# Patient Record
Sex: MALE | Race: WHITE | Employment: FULL TIME | URBAN - METROPOLITAN AREA
[De-identification: names, ages, dates, MRNs, and addresses within clinical notes are randomized per-mention and may not be internally consistent; named-entity substitution may affect disease eponyms.]

---

## 2019-07-24 ENCOUNTER — OFFICE VISIT (OUTPATIENT)
Dept: INTERNAL MEDICINE CLINIC | Age: 24
End: 2019-07-24
Payer: COMMERCIAL

## 2019-07-24 VITALS
BODY MASS INDEX: 23.23 KG/M2 | WEIGHT: 181 LBS | OXYGEN SATURATION: 100 % | HEART RATE: 72 BPM | SYSTOLIC BLOOD PRESSURE: 126 MMHG | HEIGHT: 74 IN | DIASTOLIC BLOOD PRESSURE: 60 MMHG

## 2019-07-24 DIAGNOSIS — Z23 NEED FOR TETANUS, DIPHTHERIA, AND ACELLULAR PERTUSSIS (TDAP) VACCINE IN PATIENT OF ADOLESCENT AGE OR OLDER: ICD-10-CM

## 2019-07-24 DIAGNOSIS — N50.89 SCROTAL EDEMA: ICD-10-CM

## 2019-07-24 DIAGNOSIS — Z00.00 ANNUAL PHYSICAL EXAM: Primary | ICD-10-CM

## 2019-07-24 DIAGNOSIS — Z11.4 SCREENING FOR HIV (HUMAN IMMUNODEFICIENCY VIRUS): ICD-10-CM

## 2019-07-24 PROCEDURE — 90471 IMMUNIZATION ADMIN: CPT | Performed by: INTERNAL MEDICINE

## 2019-07-24 PROCEDURE — 90715 TDAP VACCINE 7 YRS/> IM: CPT | Performed by: INTERNAL MEDICINE

## 2019-07-24 PROCEDURE — 99385 PREV VISIT NEW AGE 18-39: CPT | Performed by: INTERNAL MEDICINE

## 2019-07-24 ASSESSMENT — PATIENT HEALTH QUESTIONNAIRE - PHQ9
SUM OF ALL RESPONSES TO PHQ QUESTIONS 1-9: 1
SUM OF ALL RESPONSES TO PHQ QUESTIONS 1-9: 1
SUM OF ALL RESPONSES TO PHQ9 QUESTIONS 1 & 2: 1
1. LITTLE INTEREST OR PLEASURE IN DOING THINGS: 0
2. FEELING DOWN, DEPRESSED OR HOPELESS: 1

## 2019-07-24 NOTE — PROGRESS NOTES
on file    Transportation needs:     Medical: Not on file     Non-medical: Not on file   Tobacco Use    Smoking status: Former Smoker     Types: Cigarettes     Last attempt to quit: 2019     Years since quittin.4    Smokeless tobacco: Current User     Types: Chew   Substance and Sexual Activity    Alcohol use: Yes     Comment: occasional     Drug use: Never    Sexual activity: Not Currently   Lifestyle    Physical activity:     Days per week: Not on file     Minutes per session: Not on file    Stress: Not on file   Relationships    Social connections:     Talks on phone: Not on file     Gets together: Not on file     Attends Gnosticist service: Not on file     Active member of club or organization: Not on file     Attends meetings of clubs or organizations: Not on file     Relationship status: Not on file    Intimate partner violence:     Fear of current or ex partner: Not on file     Emotionally abused: Not on file     Physically abused: Not on file     Forced sexual activity: Not on file   Other Topics Concern    Not on file   Social History Narrative    Not on file       Review of Systems:  A comprehensive review of systems was negative except for what was noted in the HPI. Physical Exam:   Vitals:    19 1310   BP: 126/60   Pulse: 72   SpO2: 100%   Weight: 181 lb (82.1 kg)   Height: 6' 2\" (1.88 m)     Body mass index is 23.24 kg/m². Constitutional: He is oriented to person, place, and time. He appears well-developed and well-nourished. No distress. HEENT:   Head: Normocephalic and atraumatic. Right Ear: Tympanic membrane, external ear and ear canal normal.   Left Ear: Tympanic membrane, external ear and ear canal normal.   Mouth/Throat: Oropharynx is clear and moist and mucous membranes are normal. No oropharyngeal exudate or posterior oropharyngeal erythema. He has no cervical adenopathy.    Eyes: Conjunctivae and extraocular motions are normal. Pupils are equal, round, and file for this patient. Influenza vaccine:  recommended every fall  Tetanus vaccine:  tetanus and diptheria vaccine (Td) administered today- risks and benefits discussed         Other Recommendations ·   See a dentist every 6 months  · Try to get at least 30 minutes of exercise 3-5 days per week  · Always wear a seat belt when traveling in a car  · Always wear a helmet when riding a bicycle or motorcycle  · When exposed to the sun, use a sunscreen that protects against both UVA and UVB radiation with an SPF of 30 or greater- reapply every 2 to 3 hours or after sweating, drying off with a towel, or swimming             Assessment/Plan:  Simi Huynh was seen today for establish care. Diagnoses and all orders for this visit:    Annual physical exam  -     Lipid Panel; Future  -     Comprehensive Metabolic Panel; Future  -     CBC with Differential; Future    Need for tetanus, diphtheria, and acellular pertussis (Tdap) vaccine in patient of adolescent age or older  -     Tdap (age 6y and older) IM (Preet Velez)    Screening for HIV (human immunodeficiency virus)  -     HIV Screen; Future    Scrotal edema  -     [facility] - Pavel Mart [facility], [facility]        Return Fasting Physical in 1 year.

## 2020-02-05 ENCOUNTER — OFFICE VISIT (OUTPATIENT)
Dept: INTERNAL MEDICINE CLINIC | Age: 25
End: 2020-02-05
Payer: COMMERCIAL

## 2020-02-05 VITALS
BODY MASS INDEX: 24.77 KG/M2 | SYSTOLIC BLOOD PRESSURE: 124 MMHG | OXYGEN SATURATION: 99 % | DIASTOLIC BLOOD PRESSURE: 70 MMHG | WEIGHT: 193 LBS | HEART RATE: 62 BPM | HEIGHT: 74 IN

## 2020-02-05 LAB
BASOPHILS ABSOLUTE: 0.1 K/UL (ref 0–0.2)
BASOPHILS RELATIVE PERCENT: 1 %
EOSINOPHILS ABSOLUTE: 0.4 K/UL (ref 0–0.6)
EOSINOPHILS RELATIVE PERCENT: 5.9 %
HCT VFR BLD CALC: 43.2 % (ref 40.5–52.5)
HEMOGLOBIN: 14.9 G/DL (ref 13.5–17.5)
LYMPHOCYTES ABSOLUTE: 1.9 K/UL (ref 1–5.1)
LYMPHOCYTES RELATIVE PERCENT: 31.1 %
MCH RBC QN AUTO: 31.5 PG (ref 26–34)
MCHC RBC AUTO-ENTMCNC: 34.4 G/DL (ref 31–36)
MCV RBC AUTO: 91.4 FL (ref 80–100)
MONOCYTES ABSOLUTE: 0.4 K/UL (ref 0–1.3)
MONOCYTES RELATIVE PERCENT: 7 %
NEUTROPHILS ABSOLUTE: 3.3 K/UL (ref 1.7–7.7)
NEUTROPHILS RELATIVE PERCENT: 55 %
PDW BLD-RTO: 13 % (ref 12.4–15.4)
PLATELET # BLD: 189 K/UL (ref 135–450)
PMV BLD AUTO: 8.5 FL (ref 5–10.5)
RBC # BLD: 4.73 M/UL (ref 4.2–5.9)
WBC # BLD: 6 K/UL (ref 4–11)

## 2020-02-05 PROCEDURE — 99243 OFF/OP CNSLTJ NEW/EST LOW 30: CPT | Performed by: INTERNAL MEDICINE

## 2020-02-05 PROCEDURE — 36415 COLL VENOUS BLD VENIPUNCTURE: CPT | Performed by: INTERNAL MEDICINE

## 2020-02-05 ASSESSMENT — PATIENT HEALTH QUESTIONNAIRE - PHQ9
1. LITTLE INTEREST OR PLEASURE IN DOING THINGS: 0
SUM OF ALL RESPONSES TO PHQ9 QUESTIONS 1 & 2: 0
2. FEELING DOWN, DEPRESSED OR HOPELESS: 0
SUM OF ALL RESPONSES TO PHQ QUESTIONS 1-9: 0
SUM OF ALL RESPONSES TO PHQ QUESTIONS 1-9: 0

## 2020-02-05 NOTE — PROGRESS NOTES
regular rhythm, normal heart sounds and intact distal pulses. Exam reveals no gallop and no friction rub. No murmur heard. Pulmonary/Chest: Effort normal and breath sounds normal. No respiratory distress. He has no wheezes. He has no rales. Abdominal: Soft. Normal aorta and bowel sounds are normal. He exhibits no distension and no mass. There is no hepatosplenomegaly. No tenderness. Musculoskeletal: He exhibits no edema and no tenderness. Neurological: He is alert and oriented to person, place, and time. He has normal strength. No cranial nerve deficit or sensory deficit. Coordination and gait normal.   Skin: Skin is warm and dry. No rash noted. No erythema. Psychiatric: He has a normal mood and affect. His behavior is normal.        Assessment:       31 y.o. patient with planned surgery as above. SURGERY SPECIFIC RISK:  none  Known risk factors for perioperative complications: None  Current medications which may produce withdrawal symptoms if withheld perioperatively: none      Plan:     1. Preoperative workup as follows: none  2. Change in medication regimen before surgery: None  3. Prophylaxis for cardiac events with perioperative beta-blockers: Not indicated  ACC/AHA indications for pre-operative beta-blocker use:    · Vascular surgery with history of postitive stress test  · Intermediate or high risk surgery with history of CAD   · Intermediate or high risk surgery with multiple clinical predictors of CAD- 2 of the following: history of compensated or prior heart failure, history of cerebrovascular disease, DM, or renal insufficiency    Routine administration of higher-dose, long-acting metoprolol in beta-blocker-naïve patients on the day of surgery, and in the absence of dose titration is associated with an overall increase in mortality.   Beta-blockers should be started days to weeks prior to surgery and titrated to pulse < 70.  4. Deep vein thrombosis prophylaxis: regimen to be chosen by surgical team  5.  No contraindications to planned surgery    Ranelle Manilla was seen today for pre-op exam.    Diagnoses and all orders for this visit:    Preop exam for internal medicine    Left hydrocele    Annual physical exam  -     Lipid Panel  -     Comprehensive Metabolic Panel  -     CBC Auto Differential    Screening for HIV (human immunodeficiency virus)  -     HIV Screen              TAVIA GARNER M.D.  [facility]  Office: [phone]  Fax: [phone]

## 2020-02-06 LAB
A/G RATIO: 1.9 (ref 1.1–2.2)
ALBUMIN SERPL-MCNC: 4.7 G/DL (ref 3.4–5)
ALP BLD-CCNC: 55 U/L (ref 40–129)
ALT SERPL-CCNC: 16 U/L (ref 10–40)
ANION GAP SERPL CALCULATED.3IONS-SCNC: 11 MMOL/L (ref 3–16)
AST SERPL-CCNC: 17 U/L (ref 15–37)
BILIRUB SERPL-MCNC: 0.4 MG/DL (ref 0–1)
BUN BLDV-MCNC: 13 MG/DL (ref 7–20)
CALCIUM SERPL-MCNC: 10 MG/DL (ref 8.3–10.6)
CHLORIDE BLD-SCNC: 101 MMOL/L (ref 99–110)
CHOLESTEROL, TOTAL: 176 MG/DL (ref 0–199)
CO2: 28 MMOL/L (ref 21–32)
CREAT SERPL-MCNC: 0.8 MG/DL (ref 0.9–1.3)
GFR AFRICAN AMERICAN: >60
GFR NON-AFRICAN AMERICAN: >60
GLOBULIN: 2.5 G/DL
GLUCOSE BLD-MCNC: 95 MG/DL (ref 70–99)
HDLC SERPL-MCNC: 66 MG/DL (ref 40–60)
HIV AG/AB: NORMAL
HIV ANTIGEN: NORMAL
HIV-1 ANTIBODY: NORMAL
HIV-2 AB: NORMAL
LDL CHOLESTEROL CALCULATED: 102 MG/DL
POTASSIUM SERPL-SCNC: 4.8 MMOL/L (ref 3.5–5.1)
SODIUM BLD-SCNC: 140 MMOL/L (ref 136–145)
TOTAL PROTEIN: 7.2 G/DL (ref 6.4–8.2)
TRIGL SERPL-MCNC: 40 MG/DL (ref 0–150)
VLDLC SERPL CALC-MCNC: 8 MG/DL

## 2020-12-07 ENCOUNTER — OFFICE VISIT (OUTPATIENT)
Dept: INTERNAL MEDICINE CLINIC | Age: 25
End: 2020-12-07
Payer: COMMERCIAL

## 2020-12-07 VITALS
BODY MASS INDEX: 27.34 KG/M2 | WEIGHT: 213 LBS | HEART RATE: 64 BPM | OXYGEN SATURATION: 98 % | TEMPERATURE: 97.4 F | DIASTOLIC BLOOD PRESSURE: 68 MMHG | HEIGHT: 74 IN | SYSTOLIC BLOOD PRESSURE: 116 MMHG

## 2020-12-07 PROCEDURE — 99395 PREV VISIT EST AGE 18-39: CPT | Performed by: INTERNAL MEDICINE

## 2020-12-07 PROCEDURE — 99243 OFF/OP CNSLTJ NEW/EST LOW 30: CPT | Performed by: INTERNAL MEDICINE

## 2020-12-07 NOTE — PROGRESS NOTES
[facility]  History and Physical  German Moss M.D. Kin Montalvoord  YOB: 1995    Date of Service:  12/7/2020    Chief Complaint:   Mark Anthony De La Garza is a 31 y.o. male who presents for   Chief Complaint   Patient presents with   Bhanuk Robertyovany Pre-op Exam     12/14/2020- Hydrocelectomy- Dr. Troy Castellano        HPI: Here for Annual Physical and Follow up. No results found for: LABA1C, LABMICR  Lab Results   Component Value Date     02/05/2020    K 4.8 02/05/2020     02/05/2020    CO2 28 02/05/2020    BUN 13 02/05/2020    CREATININE 0.8 (L) 02/05/2020    GLUCOSE 95 02/05/2020    CALCIUM 10.0 02/05/2020     Lab Results   Component Value Date    CHOL 176 02/05/2020    TRIG 40 02/05/2020    HDL 66 02/05/2020    LDLCALC 102 02/05/2020     Lab Results   Component Value Date    ALT 16 02/05/2020    AST 17 02/05/2020     No results found for: TSH, T4FREE  Lab Results   Component Value Date    WBC 6.0 02/05/2020    HGB 14.9 02/05/2020    HCT 43.2 02/05/2020    MCV 91.4 02/05/2020     02/05/2020     No results found for: INR   No results found for: PSA   No results found for: LABURIC     Wt Readings from Last 3 Encounters:   12/07/20 213 lb (96.6 kg)   02/05/20 193 lb (87.5 kg)   07/24/19 181 lb (82.1 kg)     BP Readings from Last 3 Encounters:   12/07/20 116/68   02/05/20 124/70   07/24/19 126/60       There is no problem list on file for this patient. No Known Allergies  No outpatient medications have been marked as taking for the 12/7/20 encounter (Office Visit) with Makayla Mata MD.       History reviewed. No pertinent past medical history.   Past Surgical History:   Procedure Laterality Date    APPENDECTOMY       Family History   Problem Relation Age of Onset    Bipolar Disorder Mother     Depression Mother     Depression Sister     Heart Attack Maternal Grandfather 66    Heart Disease Maternal Grandfather     Cancer Paternal Grandmother         skin    Other Paternal Grandfather         pulmonary hypertension    Depression Maternal Uncle     Early Death Maternal Uncle 47        Suicide     Social History     Socioeconomic History    Marital status: Single     Spouse name: Not on file    Number of children: Not on file    Years of education: Not on file    Highest education level: Not on file   Occupational History    Occupation: sales   Social Needs    Financial resource strain: Not on file    Food insecurity     Worry: Not on file     Inability: Not on file   [employer] needs     Medical: Not on file     Non-medical: Not on file   Tobacco Use    Smoking status: Former Smoker     Types: Cigarettes     Last attempt to quit: 2019     Years since quittin.8    Smokeless tobacco: Current User     Types: Chew   Substance and Sexual Activity    Alcohol use: Yes     Comment: occasional     Drug use: Never    Sexual activity: Not Currently   Lifestyle    Physical activity     Days per week: Not on file     Minutes per session: Not on file    Stress: Not on file   Relationships    Social connections     Talks on phone: Not on file     Gets together: Not on file     Attends Yazdanism service: Not on file     Active member of club or organization: Not on file     Attends meetings of clubs or organizations: Not on file     Relationship status: Not on file    Intimate partner violence     Fear of current or ex partner: Not on file     Emotionally abused: Not on file     Physically abused: Not on file     Forced sexual activity: Not on file   Other Topics Concern    Not on file   Social History Narrative    Not on file       Review of Systems:  A comprehensive review of systems was negative except for what was noted in the HPI. Physical Exam:   Vitals:    20 0847   BP: 116/68   Pulse: 64   Temp: 97.4 °F (36.3 °C)   TempSrc: Infrared   SpO2: 98%   Weight: 213 lb (96.6 kg)   Height: 6' 2\" (1.88 m)     Body mass index is 27.35 kg/m².   Constitutional: He has sex with females   Last eye exam: 2020, normal  Exercise: walks 5 time(s) per week and aerobics 3 time(s) per week         Preventive plan of care for Madisyn Vásquez        12/7/2020           Preventive Measures Status       Recommendations for screening           Diabetes Screen  Glucose (mg/dL)   Date Value   02/05/2020 95    Repeat next year   Cholesterol Screen  Lab Results   Component Value Date    CHOL 176 02/05/2020    TRIG 40 02/05/2020    HDL 66 (H) 02/05/2020    LDLCALC 102 (H) 02/05/2020    Repeat yearly       Weight: Body mass index is 27.35 kg/m². 6' 2\" (1.88 m)213 lb (96.6 kg)  Your BMI is 25 or greater, which indicates that you are overweight        Recommended Immunizations    Immunization History   Administered Date(s) Administered    Tdap (Boostrix, Adacel) 07/24/2019    Influenza vaccine:  recommended yearly, but declined         Other Recommendations ·   See a dentist every 6 months  · Try to get at least 30 minutes of exercise 3-5 days per week  · Always wear a seat belt when traveling in a car  · Always wear a helmet when riding a bicycle or motorcycle  · When exposed to the sun, use a sunscreen that protects against both UVA and UVB radiation with an SPF of 30 or greater- reapply every 2 to 3 hours or after sweating, drying off with a towel, or swimming             Assessment/Plan:  Angela Arroyo was seen today for pre-op exam.    Diagnoses and all orders for this visit:    Annual physical exam    Preop exam for internal medicine    Left hydrocele        Return Dec 6 at 8:10 Fasting Physical in 1 year.

## 2020-12-07 NOTE — PROGRESS NOTES
PREOPERATIVE CONSULTATION      Ashwin Vásquez  YOB: 1995    Date of Service:  12/7/2020    Vitals:    12/07/20 0847   BP: 116/68   Pulse: 64   Temp: 97.4 °F (36.3 °C)   TempSrc: Infrared   SpO2: 98%   Weight: 213 lb (96.6 kg)   Height: 6' 2\" (1.88 m)      Wt Readings from Last 2 Encounters:   12/07/20 213 lb (96.6 kg)   02/05/20 193 lb (87.5 kg)     BP Readings from Last 3 Encounters:   12/07/20 116/68   02/05/20 124/70   07/24/19 126/60        Chief Complaint   Patient presents with    Pre-op Exam     12/14/2020- Hydrocelectomy- Dr. Joie Fitzgerald      No Known Allergies  No outpatient medications have been marked as taking for the 12/7/20 encounter (Office Visit) with Aishwarya Wall MD.       This patient presents to the office today for a preoperative consultation at the request of surgeon, Manish Dunn, who plans on performing LEFT HYDROCELE SURGERY  on December 14 at [facility]. The current problem began 1 year ago, and symptoms have been worsening with time. Conservative therapy: Yes: LEFT HYDROCELE SURGERY , which has been not very effective. .    Planned anesthesia: General   Known anesthesia problems: None   Bleeding risk: No recent or remote history of abnormal bleeding  Personal or FH of DVT/PE: No    Patient objection to receiving blood products: No    There is no problem list on file for this patient. History reviewed. No pertinent past medical history.   Past Surgical History:   Procedure Laterality Date    APPENDECTOMY       Family History   Problem Relation Age of Onset    Bipolar Disorder Mother     Depression Mother     Depression Sister     Heart Attack Maternal Grandfather 66    Heart Disease Maternal Grandfather     Cancer Paternal Grandmother         skin    Other Paternal Grandfather         pulmonary hypertension    Depression Maternal Uncle     Early Death Maternal Uncle 47        Suicide     Social History     Socioeconomic History    Marital status: Single Spouse name: Not on file    Number of children: Not on file    Years of education: Not on file    Highest education level: Not on file   Occupational History    Occupation: sales   Social Needs    Financial resource strain: Not on file    Food insecurity     Worry: Not on file     Inability: Not on file   [employer] needs     Medical: Not on file     Non-medical: Not on file   Tobacco Use    Smoking status: Former Smoker     Types: Cigarettes     Last attempt to quit: 2019     Years since quittin.8    Smokeless tobacco: Current User     Types: Chew   Substance and Sexual Activity    Alcohol use: Yes     Comment: occasional     Drug use: Never    Sexual activity: Not Currently   Lifestyle    Physical activity     Days per week: Not on file     Minutes per session: Not on file    Stress: Not on file   Relationships    Social connections     Talks on phone: Not on file     Gets together: Not on file     Attends Hinduism service: Not on file     Active member of club or organization: Not on file     Attends meetings of clubs or organizations: Not on file     Relationship status: Not on file    Intimate partner violence     Fear of current or ex partner: Not on file     Emotionally abused: Not on file     Physically abused: Not on file     Forced sexual activity: Not on file   Other Topics Concern    Not on file   Social History Narrative    Not on file       Review of Systems  A comprehensive review of systems was negative except for what was noted in the HPI. Physical Exam   Constitutional: He is oriented to person, place, and time. He appears well-developed and well-nourished. No distress. HENT:   Head: Normocephalic and atraumatic. Mouth/Throat: Uvula is midline, oropharynx is clear and moist and mucous membranes are normal.   Eyes: Conjunctivae and EOM are normal. Pupils are equal, round, and reactive to light. Neck: Trachea normal and normal range of motion.  Neck supple. No JVD present. Carotid bruit is not present. No mass and no thyromegaly present. Cardiovascular: Normal rate, regular rhythm, normal heart sounds and intact distal pulses. Exam reveals no gallop and no friction rub. No murmur heard. Pulmonary/Chest: Effort normal and breath sounds normal. No respiratory distress. He has no wheezes. He has no rales. Abdominal: Soft. Normal aorta and bowel sounds are normal. He exhibits no distension and no mass. There is no hepatosplenomegaly. No tenderness. Musculoskeletal: He exhibits no edema and no tenderness. Neurological: He is alert and oriented to person, place, and time. He has normal strength. No cranial nerve deficit or sensory deficit. Coordination and gait normal.   Skin: Skin is warm and dry. No rash noted. No erythema. Psychiatric: He has a normal mood and affect. His behavior is normal.       No results found for: LABA1C, LABMICR  Lab Results   Component Value Date     02/05/2020    K 4.8 02/05/2020     02/05/2020    CO2 28 02/05/2020    BUN 13 02/05/2020    CREATININE 0.8 (L) 02/05/2020    GLUCOSE 95 02/05/2020    CALCIUM 10.0 02/05/2020     Lab Results   Component Value Date    CHOL 176 02/05/2020    TRIG 40 02/05/2020    HDL 66 02/05/2020    LDLCALC 102 02/05/2020     Lab Results   Component Value Date    ALT 16 02/05/2020    AST 17 02/05/2020     No results found for: TSH, T4FREE  Lab Results   Component Value Date    WBC 6.0 02/05/2020    HGB 14.9 02/05/2020    HCT 43.2 02/05/2020    MCV 91.4 02/05/2020     02/05/2020     No results found for: INR   No results found for: PSA   No results found for: LABURIC        Assessment:       31 y.o. patient with planned surgery as above. SURGERY SPECIFIC RISK:  none  Known risk factors for perioperative complications: None  Current medications which may produce withdrawal symptoms if withheld perioperatively: none      Plan:     1. Preoperative workup as follows: none  2.  Change in medication regimen before surgery: None  3. Prophylaxis for cardiac events with perioperative beta-blockers: Not indicated  ACC/AHA indications for pre-operative beta-blocker use:    · Vascular surgery with history of postitive stress test  · Intermediate or high risk surgery with history of CAD   · Intermediate or high risk surgery with multiple clinical predictors of CAD- 2 of the following: history of compensated or prior heart failure, history of cerebrovascular disease, DM, or renal insufficiency    Routine administration of higher-dose, long-acting metoprolol in beta-blocker-naïve patients on the day of surgery, and in the absence of dose titration is associated with an overall increase in mortality. Beta-blockers should be started days to weeks prior to surgery and titrated to pulse < 70.  4. Deep vein thrombosis prophylaxis: regimen to be chosen by surgical team  5. No contraindications to planned surgery    Franco Greene was seen today for pre-op exam.    Diagnoses and all orders for this visit:    Annual physical exam    Preop exam for internal medicine    Left hydrocele        .       Juno Don M.D.  [facility]  Office: [phone]  Fax: [phone]

## 2021-12-27 ENCOUNTER — OFFICE VISIT (OUTPATIENT)
Dept: INTERNAL MEDICINE CLINIC | Age: 26
End: 2021-12-27

## 2021-12-27 VITALS
DIASTOLIC BLOOD PRESSURE: 80 MMHG | SYSTOLIC BLOOD PRESSURE: 130 MMHG | TEMPERATURE: 99 F | BODY MASS INDEX: 28.89 KG/M2 | HEART RATE: 100 BPM | OXYGEN SATURATION: 98 % | WEIGHT: 225 LBS

## 2021-12-27 DIAGNOSIS — M54.41 LOW BACK PAIN WITH RIGHT-SIDED SCIATICA, UNSPECIFIED BACK PAIN LATERALITY, UNSPECIFIED CHRONICITY: ICD-10-CM

## 2021-12-27 DIAGNOSIS — M54.2 CERVICAL PAIN: ICD-10-CM
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
## 2021-12-27 PROCEDURE — 99213 OFFICE O/P EST LOW 20 MIN: CPT | Performed by: NURSE PRACTITIONER

## 2021-12-27 RX ORDER — METHOCARBAMOL 500 MG/1
500 TABLET, FILM COATED ORAL 4 TIMES DAILY
Qty: 40 TABLET | Refills: 0 | Status: SHIPPED | OUTPATIENT
Start: 2021-12-27 | End: 2022-01-10 | Stop reason: SDUPTHER

## 2021-12-27 RX ORDER — PREDNISONE 10 MG/1
TABLET ORAL
Qty: 30 TABLET | Refills: 0 | Status: SHIPPED | OUTPATIENT
Start: 2021-12-27 | End: 2022-02-02

## 2021-12-27 ASSESSMENT — ENCOUNTER SYMPTOMS
CHEST TIGHTNESS: 0
DIARRHEA: 0
CONSTIPATION: 0
BACK PAIN: 1
VOMITING: 0
SINUS PAIN: 0
COUGH: 0
SHORTNESS OF BREATH: 0
NAUSEA: 0
SORE THROAT: 0

## 2021-12-27 ASSESSMENT — PATIENT HEALTH QUESTIONNAIRE - PHQ9
1. LITTLE INTEREST OR PLEASURE IN DOING THINGS: 0
SUM OF ALL RESPONSES TO PHQ9 QUESTIONS 1 & 2: 0
SUM OF ALL RESPONSES TO PHQ QUESTIONS 1-9: 0
SUM OF ALL RESPONSES TO PHQ QUESTIONS 1-9: 0
2. FEELING DOWN, DEPRESSED OR HOPELESS: 0
SUM OF ALL RESPONSES TO PHQ QUESTIONS 1-9: 0
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
## 2022-01-10 ENCOUNTER — OFFICE VISIT (OUTPATIENT)
Dept: INTERNAL MEDICINE CLINIC | Age: 27
End: 2022-01-10

## 2022-01-10 VITALS
DIASTOLIC BLOOD PRESSURE: 76 MMHG | SYSTOLIC BLOOD PRESSURE: 104 MMHG | HEIGHT: 72 IN | OXYGEN SATURATION: 98 % | BODY MASS INDEX: 30.48 KG/M2 | WEIGHT: 225 LBS | HEART RATE: 82 BPM

## 2022-01-10 DIAGNOSIS — S46.911S: ICD-10-CM

## 2022-01-10 DIAGNOSIS — M54.41 ACUTE RIGHT-SIDED LOW BACK PAIN WITH RIGHT-SIDED SCIATICA: Primary | ICD-10-CM

## 2022-01-10 DIAGNOSIS — S16.1XXS ACUTE STRAIN OF NECK MUSCLE, SEQUELA: ICD-10-CM

## 2022-01-10 DIAGNOSIS — S44.01XA INJURY OF RIGHT ULNAR NERVE AT UPPER ARM LEVEL, INITIAL ENCOUNTER: ICD-10-CM

## 2022-01-10 PROCEDURE — 99213 OFFICE O/P EST LOW 20 MIN: CPT | Performed by: INTERNAL MEDICINE

## 2022-01-10 RX ORDER — METHOCARBAMOL 500 MG/1
500 TABLET, FILM COATED ORAL 2 TIMES DAILY PRN
Qty: 30 TABLET | Refills: 0 | Status: SHIPPED | OUTPATIENT
Start: 2022-01-10 | End: 2022-02-02 | Stop reason: ALTCHOICE

## 2022-01-10 RX ORDER — MELOXICAM 15 MG/1
15 TABLET ORAL DAILY
Qty: 30 TABLET | Refills: 2 | Status: SHIPPED | OUTPATIENT
Start: 2022-01-10 | End: 2022-05-12

## 2022-01-10 SDOH — ECONOMIC STABILITY: FOOD INSECURITY: WITHIN THE PAST 12 MONTHS, YOU WORRIED THAT YOUR FOOD WOULD RUN OUT BEFORE YOU GOT MONEY TO BUY MORE.: NEVER TRUE

## 2022-01-10 SDOH — ECONOMIC STABILITY: FOOD INSECURITY: WITHIN THE PAST 12 MONTHS, THE FOOD YOU BOUGHT JUST DIDN'T LAST AND YOU DIDN'T HAVE MONEY TO GET MORE.: NEVER TRUE

## 2022-01-10 ASSESSMENT — SOCIAL DETERMINANTS OF HEALTH (SDOH): HOW HARD IS IT FOR YOU TO PAY FOR THE VERY BASICS LIKE FOOD, HOUSING, MEDICAL CARE, AND HEATING?: NOT VERY HARD
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
## 2022-02-02 ENCOUNTER — OFFICE VISIT (OUTPATIENT)
Dept: INTERNAL MEDICINE CLINIC | Age: 27
End: 2022-02-02
Payer: COMMERCIAL

## 2022-02-02 VITALS
WEIGHT: 224 LBS | HEART RATE: 92 BPM | HEIGHT: 72 IN | SYSTOLIC BLOOD PRESSURE: 114 MMHG | BODY MASS INDEX: 30.34 KG/M2 | OXYGEN SATURATION: 96 % | DIASTOLIC BLOOD PRESSURE: 76 MMHG

## 2022-02-02 DIAGNOSIS — Z00.00 ANNUAL PHYSICAL EXAM: Primary | ICD-10-CM

## 2022-02-02 DIAGNOSIS — M54.31 RIGHT SIDED SCIATICA: ICD-10-CM

## 2022-02-02 DIAGNOSIS — Z11.59 ENCOUNTER FOR HEPATITIS C SCREENING TEST FOR LOW RISK PATIENT: ICD-10-CM

## 2022-02-02 PROBLEM — M54.41 RIGHT-SIDED LOW BACK PAIN WITH RIGHT-SIDED SCIATICA: Status: ACTIVE | Noted: 2022-02-02

## 2022-02-02 LAB
A/G RATIO: 1.8 (ref 1.1–2.2)
ALBUMIN SERPL-MCNC: 4.7 G/DL (ref 3.4–5)
ALP BLD-CCNC: 94 U/L (ref 40–129)
ALT SERPL-CCNC: 42 U/L (ref 10–40)
ANION GAP SERPL CALCULATED.3IONS-SCNC: 13 MMOL/L (ref 3–16)
AST SERPL-CCNC: 23 U/L (ref 15–37)
BASOPHILS ABSOLUTE: 0.1 K/UL (ref 0–0.2)
BASOPHILS RELATIVE PERCENT: 1.1 %
BILIRUB SERPL-MCNC: 0.4 MG/DL (ref 0–1)
BUN BLDV-MCNC: 15 MG/DL (ref 7–20)
CALCIUM SERPL-MCNC: 9.7 MG/DL (ref 8.3–10.6)
CHLORIDE BLD-SCNC: 100 MMOL/L (ref 99–110)
CHOLESTEROL, TOTAL: 198 MG/DL (ref 0–199)
CO2: 26 MMOL/L (ref 21–32)
CREAT SERPL-MCNC: 1.1 MG/DL (ref 0.9–1.3)
EOSINOPHILS ABSOLUTE: 0.5 K/UL (ref 0–0.6)
EOSINOPHILS RELATIVE PERCENT: 10.1 %
GFR AFRICAN AMERICAN: >60
GFR NON-AFRICAN AMERICAN: >60
GLUCOSE BLD-MCNC: 92 MG/DL (ref 70–99)
HCT VFR BLD CALC: 46.9 % (ref 40.5–52.5)
HDLC SERPL-MCNC: 40 MG/DL (ref 40–60)
HEMOGLOBIN: 16 G/DL (ref 13.5–17.5)
HEPATITIS C ANTIBODY INTERPRETATION: NORMAL
LDL CHOLESTEROL CALCULATED: 132 MG/DL
LYMPHOCYTES ABSOLUTE: 1.1 K/UL (ref 1–5.1)
LYMPHOCYTES RELATIVE PERCENT: 22.3 %
MCH RBC QN AUTO: 30.8 PG (ref 26–34)
MCHC RBC AUTO-ENTMCNC: 34.2 G/DL (ref 31–36)
MCV RBC AUTO: 90 FL (ref 80–100)
MONOCYTES ABSOLUTE: 0.5 K/UL (ref 0–1.3)
MONOCYTES RELATIVE PERCENT: 8.9 %
NEUTROPHILS ABSOLUTE: 3 K/UL (ref 1.7–7.7)
NEUTROPHILS RELATIVE PERCENT: 57.6 %
PDW BLD-RTO: 13.5 % (ref 12.4–15.4)
PLATELET # BLD: 280 K/UL (ref 135–450)
PMV BLD AUTO: 7.7 FL (ref 5–10.5)
POTASSIUM SERPL-SCNC: 4.7 MMOL/L (ref 3.5–5.1)
RBC # BLD: 5.21 M/UL (ref 4.2–5.9)
SODIUM BLD-SCNC: 139 MMOL/L (ref 136–145)
TOTAL PROTEIN: 7.3 G/DL (ref 6.4–8.2)
TRIGL SERPL-MCNC: 132 MG/DL (ref 0–150)
VLDLC SERPL CALC-MCNC: 26 MG/DL
WBC # BLD: 5.1 K/UL (ref 4–11)

## 2022-02-02 PROCEDURE — 36415 COLL VENOUS BLD VENIPUNCTURE: CPT | Performed by: INTERNAL MEDICINE

## 2022-02-02 PROCEDURE — 99213 OFFICE O/P EST LOW 20 MIN: CPT | Performed by: INTERNAL MEDICINE

## 2022-02-02 PROCEDURE — 99395 PREV VISIT EST AGE 18-39: CPT | Performed by: INTERNAL MEDICINE

## 2022-02-02 RX ORDER — PREDNISONE 20 MG/1
TABLET ORAL
Qty: 18 TABLET | Refills: 0 | Status: SHIPPED | OUTPATIENT
Start: 2022-02-02 | End: 2022-02-12

## 2022-02-02 RX ORDER — GABAPENTIN 300 MG/1
300 CAPSULE ORAL 3 TIMES DAILY PRN
Qty: 90 CAPSULE | Refills: 0 | Status: SHIPPED | OUTPATIENT
Start: 2022-02-02 | End: 2022-03-02 | Stop reason: SDUPTHER

## 2022-02-02 RX ORDER — GABAPENTIN 600 MG/1
600 TABLET ORAL 3 TIMES DAILY
Qty: 90 TABLET | Refills: 0 | Status: SHIPPED
Start: 2022-02-02 | End: 2022-02-02 | Stop reason: CLARIF

## 2022-02-02 NOTE — LETTER
CONTROLLED SUBSTANCE MEDICATION AGREEMENT     Patient Name: Kenneth Vásquez  Patient YOB: 1995   I understand, that controlled substance medications may be used to help better manage my symptoms and to improve my ability to function at home, work and in social settings. However, I also understand that these medications do have risks, which have been discussed with me, including possible development of physical or psychological dependence. I understand that successful treatment requires mutual trust and honesty between me and my provider. I understand and agree that following this Medication Agreement is necessary in continuing my provider-patient relationship and the success of my treatment plan. Explanation from my Provider: Benefits and Goals of Controlled Substance Medications: There are two potential goals for your treatment: (1) decreased pain and suffering (2) improved daily life functions. There are many possible treatments for your chronic condition(s). Alternatives such as physical therapy, yoga, massage, home daily exercise, meditation, relaxation techniques, injections, chiropractic manipulations, surgery, cognitive therapy, hypnosis and many medications that are not habit-forming may be used. Use of controlled substance medications may be helpful, but they are unlikely to resolve all symptoms or restore all function. Explanation from my Provider: Risks of Controlled Substance Medications:  Opioid pain medications: These medications can lead to problems such as addiction/dependence, sedation, lightheadedness/dizziness, memory issues, falls, constipation, nausea, or vomiting. They may also impair the ability to drive or operate machinery. Additionally, these medications may lower testosterone levels, leading to loss of bone strength, stamina and sex drive.   They may cause problems with breathing, sleep apnea and reduced coughing, which is especially dangerous for patients with lung disease. Overdose or dangerous interactions with alcohol and other medications may occur, leading to death. Hyperalgesia may develop, which means patients receiving opioids for the treatment of pain may become more sensitive to certain painful stimuli, and in some cases, experience pain from ordinarily non-painful stimuli. Women between the ages of 14-53 who could become pregnant should carefully weigh the risks and benefits of opioids with their physicians, as these medications increase the risk of pregnancy complications, including miscarriage,  delivery and stillbirth. It is also possible for babies to be born addicted to opioids. Opioid dependence withdrawal symptoms may include; feelings of uneasiness, increased pain, irritability, belly pain, diarrhea, sweats and goose-flesh. Benzodiazepines and non-benzodiazepine sleep medications: These medications can lead to problems such as addiction/dependence, sedation, fatigue, lightheadedness, dizziness, incoordination, falls, depression, hallucinations, and impaired judgment, memory and concentration. The ability to drive and operate machinery may also be affected. Abnormal sleep-related behaviors have been reported, including sleepwalking, driving, making telephone calls, eating, or having sex while not fully awake. These medications can suppress breathing and worsen sleep apnea, particularly when combined with alcohol or other sedating medications, potentially leading to death. Dependence withdrawal symptoms may include tremors, anxiety, hallucinations and seizures. Stimulants:  Common adverse effects include addiction/dependence, increased blood  pressure and heart rate, decreased appetite, nausea, involuntary weight loss, insomnia,                                                                                                                     Initials:_______   irritability, and headaches.   These risks may increase when these medications are combined with other stimulants, such as caffeine pills or energy drinks, certain weight loss supplements and oral decongestants. Dependence withdrawal symptoms may include depressed mood, loss of interest, suicidal thoughts, anxiety, fatigue, appetite changes and agitation. Testosterone replacement therapy:  Potential side effects include increased risk of stroke and heart attack, blood clots, increased blood pressure, increased cholesterol, enlarged prostate, sleep apnea, irritability/aggression and other mood disorders, and decreased fertility. I agree and understand that I and my prescriber have the following rights and responsibilities regarding my treatment plan:     1. MY RIGHTS:  To be informed of my treatment and medication plan. To be an active participant in my health and wellbeing. 2. MY RESPONSIBILITY AND UNDERSTANDING FOR USE OF MEDICATIONS   I will take medications at the dose and frequency as directed. For my safety, I will not increase or change how I take my medications without the recommendation of my healthcare provider.  I will actively participate in any program recommended by my provider which may improve function, including social, physical, psychological programs.  I will not take my medications with alcohol or other drugs not prescribed to me. I understand that drinking alcohol with my medications increases the chances of side effects, including reduced breathing rate and could lead to personal injury when operating machinery.  I understand that if I have a history of substance use disorders, including alcohol or other illicit drugs, that I may be at increased risk of addiction to my medications.  I agree to notify my provider immediately if I should become pregnant so that my treatment plan can be adjusted.    I agree and understand that I shall only receive controlled substance medications from the prescriber that signed this agreement unless there is written agreement among other prescribers of controlled substances outlining the responsibility of the medications being prescribed.  I understand that the if the controlled medication is not helping to achieve goals, the dosage may be tapered and no longer prescribed. 3. MY RESPONSIBILITY FOR COMMUNICATION / PRESCRIPTION RENEWALS   I agree that all controlled substance medications that I take will be prescribed only by my provider. If another healthcare provider prescribes me medication in an emergency, I will notify my provider within seventy-two (72) hours.  I will arrange for refills at the prescribed interval ONLY during regular office hours. I will not ask for refills earlier than agreed, after-hours, on holidays or weekends. Refills may take up to 72 hours for processing and prescriptions to reach the pharmacy.  I will inform my other health care providers that I am taking these medications and of the existence of this [id number]. In the event of an emergency, I will provide the same information to the emergency department prescribers.  I will keep my provider updated on the pharmacy I am using for controlled medication prescription filling. Initials:_______  4. MY RESPONSIBILITY FOR PROTECTING MEDICATIONS   I will protect my prescriptions and medications. I understand that lost or misplaced prescriptions will not be replaced.  I will keep medications only for my own use and will not share them with others. I will keep all medications away from children.  I agree that if my medications are adjusted or discontinued, I will properly dispose of any remaining medications. I understand that I will be required to dispose of any remaining controlled medications as, directed by my prescriber, prior to being provided with any prescriptions for other controlled medications.   Medication drop box locations can be found at: [employer]    5. MY RESPONSIBILITY WITH ILLEGAL DRUGS    I will not use illegal or street drugs or another person's prescription medications not prescribed to me.  If there are identified addiction type symptoms, then referral to a program may be provided by my provider and I agree to follow through with this recommendation. 6. MY RESPONSIBILITY FOR COOPERATION WITH INVESTIGATIONS   I understand that my provider will comply with any applicable law and may discuss my use and/or possible misuse/abuse of controlled substances and alcohol, as appropriate, with any health care provider involved in my care, pharmacist, or legal authority.  I authorize my provider and pharmacy to cooperate fully with law enforcement agencies (as permitted by law) in the investigation of any possible misuse, sale, or other diversion of my controlled substances.  I agree to waive any applicable privilege or right of privacy or confidentiality with respect to these authorizations. 7. PROVIDERS RIGHT TO MONITOR FOR SAFETY: PRESCRIPTION MONITORING / DRUG TESTING   I consent to drug/toxicology screening and will submit to a drug screen upon my providers request to assure I am only taking the prescribed drugs for my safety monitoring. I understand that a drug screen is a laboratory test in which a sample of my urine, blood or saliva is checked to see what drugs I have been taking. This may entail an observed urine specimen, which means that a nurse or other health care provider may watch me provide urine, and I will cooperate if I am asked to provide an observed specimen.  I understand that my provider will check a copy of my State Prescription Monitoring Program () Report in order to safely prescribe medications.  Pill Counts: I consent to pill counts when requested.   I may be asked to bring all my prescribed controlled substance medications, in their original bottles, to all of my scheduled appointments. In addition, my provider may ask me to come to the practice at any time for a random pill count. 8. TERMINATION OF THIS AGREEMENT  For my safety, my prescriber has the right to stop prescribing controlled substance medications and may end this agreement. Initials:_______   Conditions that may result in termination of this agreement:  a. I do not show any improvement in pain, or my activity has not improved. b. I develop rapid tolerance or loss of improvement, as described in my treatment plan.  c. I develop significant side effects from the medication. d. My behavior is not consistent with the responsibilities outlined above, thereby causing safety concerns to continue prescribing controlled substance medications. e. I fail to follow the terms of this agreement. f. Other:____________________________       UNDERSTANDING THIS MEDICATION AGREEMENT:    I have read the above and have had all my questions answered. For chronic disease management, I know that my symptoms can be managed with many types of treatments. A chronic medication trial may be part of my treatment, but I must be an active participant in my care. Medication therapy is only one part of my symptom management plan. In some cases, there may be limited scientific evidence to support the chronic use of certain medications to improve symptoms and daily function. Furthermore, in certain circumstances, there may be scientific information that suggests that the use of chronic controlled substances may worsen my symptoms and increase my risk of unintentional death directly related to this medication therapy. I know that if my provider feels my risk from controlled medications is greater than my benefit, I will have my controlled substance medication(s) compassionately lowered or removed altogether.      I further agree to allow this office to contact my HIPAA contact if there are concerns about my safety and use of the controlled medications. I have agreed to use the prescribed controlled substance medications to me as instructed by my provider and as stated in this Medication Agreement. My initial on each page and my signature below shows that I have read each page and I have had the opportunity to ask questions with answers provided by my provider.     Patient Name (Printed): _____________________________________      Patient Signature:  ______________________   Date: _____________    Prescriber Name (Printed): Josep Deng MD  Prescriber Signature:  Date: 2/2/2022

## 2022-02-02 NOTE — PROGRESS NOTES
[facility] Primary Care  History and Physical  Leeroy Patel M.D. Mary Vásquez  YOB: 1995    Date of Service:  2/2/2022    Chief Complaint:   Shell Moran is a 31 y.o. male who presents for   Chief Complaint   Patient presents with    Back Pain     lower back pain radiating into right leg.  Neck Pain    Annual Exam       Assessment/Plan:    Maura Boss was seen today for back pain, neck pain and annual exam.    Diagnoses and all orders for this visit:    Annual physical exam  -     Lipid Panel  -     Comprehensive Metabolic Panel  -     CBC Auto Differential    Encounter for hepatitis C screening test for low risk patient  -     Hepatitis C Antibody    Right sided sciatica  -     predniSONE (DELTASONE) 20 MG tablet; Take 3 pills in the AM for 2 day, 2 pills in AM for 5 days, then 1 pill in AM for 2 days  -     gabapentin (NEURONTIN) 300 MG capsule; Take 1 capsule by mouth 3 times daily as needed (pain) for up to 30 days. Return VV 1 month on back pain. HPI: Here for Annual Physical and Follow up. He complain of persistent right low back pain radiating to his right leg which has worsen for the past 2 weeks. It was 4/10 last month and now it's 8/10 after he had to sit for couple of hours for work. He usually uses his stand up desk. His neck pain, shoulder, right elbow pain and tingling right 4-5th finger have improved and only have mild pain intermittently. He has top Mobic and ran out of robaxin a week ago but it wasn't helping with his back pain.     No results found for: LABA1C, LABMICR  Lab Results   Component Value Date     02/05/2020    K 4.8 02/05/2020     02/05/2020    CO2 28 02/05/2020    BUN 13 02/05/2020    CREATININE 0.8 (L) 02/05/2020    GLUCOSE 95 02/05/2020    CALCIUM 10.0 02/05/2020     Lab Results   Component Value Date    CHOL 176 02/05/2020    TRIG 40 02/05/2020    HDL 66 02/05/2020    LDLCALC 102 02/05/2020     Lab Results   Component Value Date    ALT 16 02/05/2020    AST 17 02/05/2020     No results found for: TSH, T4FREE  Lab Results   Component Value Date    WBC 6.0 02/05/2020    HGB 14.9 02/05/2020    HCT 43.2 02/05/2020    MCV 91.4 02/05/2020     02/05/2020     No results found for: INR   No results found for: PSA   No results found for: LABURIC     Wt Readings from Last 3 Encounters:   02/02/22 224 lb (101.6 kg)   01/10/22 225 lb (102.1 kg)   12/27/21 225 lb (102.1 kg)     BP Readings from Last 3 Encounters:   02/02/22 114/76   01/10/22 104/76   12/27/21 130/80       There is no problem list on file for this patient. No Known Allergies  Outpatient Medications Marked as Taking for the 2/2/22 encounter (Office Visit) with Viktoria Santillan MD   Medication Sig Dispense Refill    meloxicam (MOBIC) 15 MG tablet Take 1 tablet by mouth daily 30 tablet 2       History reviewed. No pertinent past medical history.   Past Surgical History:   Procedure Laterality Date    APPENDECTOMY       Family History   Problem Relation Age of Onset    Bipolar Disorder Mother     Depression Mother     Depression Sister     Heart Attack Maternal Grandfather 66    Heart Disease Maternal Grandfather     Cancer Paternal Grandmother         skin    Other Paternal Grandfather         pulmonary hypertension    Depression Maternal Uncle     Early Death Maternal Uncle 47        Suicide     Social History     Socioeconomic History    Marital status: Single     Spouse name: Not on file    Number of children: Not on file    Years of education: Not on file    Highest education level: Not on file   Occupational History    Occupation: sales   Tobacco Use    Smoking status: Former Smoker     Types: Cigarettes     Quit date: 2/1/2019     Years since quitting: 3.0    Smokeless tobacco: Current User     Types: Chew   Vaping Use    Vaping Use: Never used   Substance and Sexual Activity    Alcohol use: Yes     Comment: occasional     Drug use: Never    Sexual activity: Not Currently   Other Topics Concern    Not on file   Social History Narrative    Not on file     Social Determinants of Health     Financial Resource Strain: Low Risk     Difficulty of Paying Living Expenses: Not very hard   Food Insecurity: No Food Insecurity    Worried About Running Out of Food in the Last Year: Never true    Brain of Food in the Last Year: Never true   Transportation Needs:     Lack of Transportation (Medical): Not on file    Lack of Transportation (Non-Medical): Not on file   Physical Activity:     Days of Exercise per Week: Not on file    Minutes of Exercise per Session: Not on file   Stress:     Feeling of Stress : Not on file   Social Connections:     Frequency of Communication with Friends and Family: Not on file    Frequency of Social Gatherings with Friends and Family: Not on file    Attends Judaism Services: Not on file    Active Member of [address] [place] [address] [employer] or Organizations: Not on file    Attends Club or Organization Meetings: Not on file    Marital Status: Not on file   Intimate Partner Violence:     Fear of Current or Ex-Partner: Not on file    Emotionally Abused: Not on file    Physically Abused: Not on file    Sexually Abused: Not on file   Housing Stability:     Unable to Pay for Housing in the Last Year: Not on file    Number of [place] in the Last Year: Not on file    Unstable Housing in the Last Year: Not on file       Review of Systems:  A comprehensive review of systems was negative except for what was noted in the HPI. Physical Exam:   Vitals:    02/02/22 1314   BP: 114/76   Pulse: 92   SpO2: 96%   Weight: 224 lb (101.6 kg)   Height: 6' (1.829 m)     Body mass index is 30.38 kg/m². Constitutional: He is oriented to person, place, and time. He appears well-developed and well-nourished. No distress. HEENT:   Head: Normocephalic and atraumatic.    Right Ear: Tympanic membrane, external ear and ear canal normal.   Left Ear: Tympanic membrane,

## 2022-03-02 ENCOUNTER — TELEMEDICINE (OUTPATIENT)
Dept: INTERNAL MEDICINE CLINIC | Age: 27
End: 2022-03-02
Payer: COMMERCIAL

## 2022-03-02 DIAGNOSIS — M54.41 CHRONIC RIGHT-SIDED LOW BACK PAIN WITH RIGHT-SIDED SCIATICA: Primary | ICD-10-CM

## 2022-03-02 DIAGNOSIS — M54.2 CERVICAL PAIN: ICD-10-CM

## 2022-03-02 DIAGNOSIS — G89.29 CHRONIC RIGHT-SIDED LOW BACK PAIN WITH RIGHT-SIDED SCIATICA: Primary | ICD-10-CM

## 2022-03-02 PROCEDURE — 99213 OFFICE O/P EST LOW 20 MIN: CPT | Performed by: INTERNAL MEDICINE

## 2022-03-02 RX ORDER — GABAPENTIN 300 MG/1
300 CAPSULE ORAL 3 TIMES DAILY PRN
Qty: 90 CAPSULE | Refills: 5 | Status: SHIPPED | OUTPATIENT
Start: 2022-03-02 | End: 2022-08-30 | Stop reason: ALTCHOICE

## 2022-03-02 NOTE — PROGRESS NOTES
Pursuant to the emergency declaration under the [facility], [facility] waiver authority and the [employer] Act, this Virtual  Video Visit was conducted, with patient's consent, to reduce the patient's risk of exposure to COVID-19 and provide continuity of care. Service is  provided through a video synchronous discussion virtually to substitute for in-person clinic visit with the patient being at home and Dr. Janak Chacko being at home. Patient consent to the video visit. Date of Service:  3/2/2022    Chief Complaint:      Chief Complaint   Patient presents with    Back Pain       Assessment/Plan:    Susie Dewey was seen today for back pain. Diagnoses and all orders for this visit:    Chronic right-sided low back pain with right-sided sciatica  -     Purnima Weaver MD, Pain Management, [place]  -     gabapentin (NEURONTIN) 300 MG capsule; Take 1 capsule by mouth 3 times daily as needed (pain) for up to 30 days. Cervical pain  -     gabapentin (NEURONTIN) 300 MG capsule; Take 1 capsule by mouth 3 times daily as needed (pain) for up to 30 days. Stable and continue on current medications. Return VV Aug 30 at 2 chronic lbp. HPI:  Dudley Valentine is a 28 y.o. He complain of persistent back pain 7/10 despite taking Neurontin 300 mg 1 morning 1-2 PM that does help him sleep. Neck pain 3/10 and some tingling right elbow intermittently.     No results found for: LABA1C, LABMICR  Lab Results   Component Value Date     02/02/2022    K 4.7 02/02/2022     02/02/2022    CO2 26 02/02/2022    BUN 15 02/02/2022    CREATININE 1.1 02/02/2022    GLUCOSE 92 02/02/2022    CALCIUM 9.7 02/02/2022     Lab Results   Component Value Date    CHOL 198 02/02/2022    TRIG 132 02/02/2022    HDL 40 02/02/2022    LDLCALC 132 02/02/2022     Lab Results   Component Value Date    ALT 42 (H) 02/02/2022    AST 23 02/02/2022     No results found for: TSH, T4FREE, T3FREE  Lab Results   Component Value Date    WBC 5.1 02/02/2022    HGB 16.0 02/02/2022    HCT 46.9 02/02/2022    MCV 90.0 02/02/2022     02/02/2022     No results found for: INR   No results found for: PSA   No results found for: [facility]     Patient Active Problem List   Diagnosis    Right-sided low back pain with right-sided sciatica       No Known Allergies  Outpatient Medications Marked as Taking for the 3/2/22 encounter (Telemedicine) with Darryl Santillan MD   Medication Sig Dispense Refill    gabapentin (NEURONTIN) 300 MG capsule Take 1 capsule by mouth 3 times daily as needed (pain) for up to 30 days. 90 capsule 0    meloxicam (MOBIC) 15 MG tablet Take 1 tablet by mouth daily 30 tablet 2         Review of Systems: 14 systems were negative except of what was stated on HPI    Nursing note and vitals reviewed. There were no vitals filed for this visit. Wt Readings from Last 3 Encounters:   02/02/22 224 lb (101.6 kg)   01/10/22 225 lb (102.1 kg)   12/27/21 225 lb (102.1 kg)     BP Readings from Last 3 Encounters:   02/02/22 114/76   01/10/22 104/76   12/27/21 130/80     There is no height or weight on file to calculate BMI. Constitutional: Patient appears well-developed and well-nourished. No distress. Head: Normocephalic and atraumatic. Psychiatric: Normal mood and affect.  Behavior is normal.

## 2022-03-02 NOTE — PATIENT INSTRUCTIONS
[address], [place]  [address] [facility].  [address] [address], [address] [facility]  Ph: [phone]

## 2022-05-12 ENCOUNTER — OFFICE VISIT (OUTPATIENT)
Dept: PAIN MANAGEMENT | Age: 27
End: 2022-05-12
Payer: COMMERCIAL

## 2022-05-12 VITALS
BODY MASS INDEX: 25.15 KG/M2 | SYSTOLIC BLOOD PRESSURE: 99 MMHG | OXYGEN SATURATION: 99 % | WEIGHT: 196 LBS | DIASTOLIC BLOOD PRESSURE: 78 MMHG | TEMPERATURE: 97.3 F | HEIGHT: 74 IN | HEART RATE: 73 BPM

## 2022-05-12 DIAGNOSIS — M54.41 RIGHT-SIDED LOW BACK PAIN WITH RIGHT-SIDED SCIATICA, UNSPECIFIED CHRONICITY: ICD-10-CM

## 2022-05-12 DIAGNOSIS — F32.89 OTHER DEPRESSION: ICD-10-CM

## 2022-05-12 DIAGNOSIS — G89.29 CHRONIC CERVICAL PAIN: ICD-10-CM

## 2022-05-12 DIAGNOSIS — M62.830 BACK MUSCLE SPASM: ICD-10-CM

## 2022-05-12 DIAGNOSIS — M54.2 CHRONIC CERVICAL PAIN: ICD-10-CM

## 2022-05-12 DIAGNOSIS — G89.4 CHRONIC PAIN SYNDROME: ICD-10-CM

## 2022-05-12 PROCEDURE — 99243 OFF/OP CNSLTJ NEW/EST LOW 30: CPT | Performed by: NURSE PRACTITIONER

## 2022-05-12 RX ORDER — ACETAMINOPHEN 500 MG
1000 TABLET ORAL 2 TIMES DAILY
Qty: 120 TABLET | Refills: 2 | Status: SHIPPED | OUTPATIENT
Start: 2022-05-12 | End: 2022-08-30

## 2022-05-12 RX ORDER — TIZANIDINE 4 MG/1
2 TABLET ORAL 2 TIMES DAILY
Qty: 30 TABLET | Refills: 2 | Status: SHIPPED | OUTPATIENT
Start: 2022-05-12 | End: 2022-08-30

## 2022-05-12 RX ORDER — LIDOCAINE 36 MG/1
1 PATCH TOPICAL DAILY
Qty: 30 PATCH | Refills: 2 | Status: SHIPPED | OUTPATIENT
Start: 2022-05-12 | End: 2022-06-11
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
## 2022-05-12 NOTE — PATIENT INSTRUCTIONS
Patient Education        Back Stretches: Exercises  Introduction  Here are some examples of exercises for stretching your back. Start eachexercise slowly. Ease off the exercise if you start to have pain. Your doctor or physical therapist will tell you when you can start theseexercises and which ones will work best for you. How to do the exercises  Overhead stretch    1. Stand comfortably with your feet shoulder-width apart. 2. Looking straight ahead, raise both arms over your head and reach toward the ceiling. Do not allow your head to tilt back. 3. Hold for 15 to 30 seconds, then lower your arms to your sides. 4. Repeat 2 to 4 times. Side stretch    1. Stand comfortably with your feet shoulder-width apart. 2. Raise one arm over your head, and then lean to the other side. 3. Slide your hand down your leg as you let the weight of your arm gently stretch your side muscles. Hold for 15 to 30 seconds. 4. Repeat 2 to 4 times on each side. Press-up    1. Lie on your stomach, supporting your body with your forearms. 2. Press your elbows down into the floor to raise your upper back. As you do this, relax your stomach muscles and allow your back to arch without using your back muscles. As your press up, do not let your hips or pelvis come off the floor. 3. Hold for 15 to 30 seconds, then relax. 4. Repeat 2 to 4 times. Relax and rest    1. Lie on your back with a rolled towel under your neck and a pillow under your knees. Extend your arms comfortably to your sides. 2. Relax and breathe normally. 3. Remain in this position for about 10 minutes. 4. If you can, do this 2 or 3 times each day. Follow-up care is a key part of your treatment and safety. Be sure to make and go to all appointments, and call your doctor if you are having problems. It's also a good idea to know your test results and keep alist of the medicines you take. Where can you learn more? Go to [email or web address].[email or web address] org and sign in to your [facility] account. Enter K236 in the [facility] box to learn more about \"Back Stretches: Exercises. \"     If you do not have an account, please click on the \"Sign Up Now\" link. Current as of: July 1, 2021               Content Version: 13.2  © 7830-4961 [employer]. Care instructions adapted under license by [facility] [address] If you have questions about a medical condition or this instruction, always ask your healthcare professional. [address] any warranty or liability for your use of this information.

## 2022-05-20 ENCOUNTER — OFFICE VISIT (OUTPATIENT)
Dept: ORTHOPEDIC SURGERY | Age: 27
End: 2022-05-20
Payer: COMMERCIAL

## 2022-05-20 VITALS — WEIGHT: 196 LBS | BODY MASS INDEX: 25.15 KG/M2 | HEIGHT: 74 IN

## 2022-05-20 DIAGNOSIS — M54.12 CERVICAL RADICULITIS: ICD-10-CM

## 2022-05-20 DIAGNOSIS — S39.012A LUMBAR STRAIN, INITIAL ENCOUNTER: ICD-10-CM

## 2022-05-20 DIAGNOSIS — S16.1XXA STRAIN OF NECK MUSCLE, INITIAL ENCOUNTER: Primary | ICD-10-CM

## 2022-05-20 PROCEDURE — 99204 OFFICE O/P NEW MOD 45 MIN: CPT | Performed by: PHYSICIAN ASSISTANT

## 2022-05-20 RX ORDER — MELOXICAM 15 MG/1
15 TABLET ORAL DAILY
Qty: 30 TABLET | Refills: 0 | Status: SHIPPED | OUTPATIENT
Start: 2022-05-20 | End: 2022-07-11
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
## 2022-05-23 ENCOUNTER — TELEPHONE (OUTPATIENT)
Dept: ORTHOPEDIC SURGERY | Age: 27
End: 2022-05-23

## 2022-05-23 NOTE — TELEPHONE ENCOUNTER
General Question     Subject: PATIENT NEEDS AN ORDER FOR AN MRI OF CERVICAL SPINE W/O CONTRAST BE SENT TO OPEN [facility].     FAX NO.:  [phone]    Patient and /or Facility Request: Luis Oneal Adalberto,9D Number: [phone]

## 2022-05-24 ENCOUNTER — TELEPHONE (OUTPATIENT)
Dept: ORTHOPEDIC SURGERY | Age: 27
End: 2022-05-24

## 2022-05-24 ENCOUNTER — HOSPITAL ENCOUNTER (OUTPATIENT)
Dept: PHYSICAL THERAPY | Age: 27
Setting detail: THERAPIES SERIES
Discharge: HOME | End: 2022-05-24
Payer: COMMERCIAL

## 2022-05-24 PROCEDURE — 97112 NEUROMUSCULAR REEDUCATION: CPT | Performed by: PHYSICAL THERAPIST

## 2022-05-24 PROCEDURE — 97161 PT EVAL LOW COMPLEX 20 MIN: CPT | Performed by: PHYSICAL THERAPIST

## 2022-05-24 PROCEDURE — 97140 MANUAL THERAPY 1/> REGIONS: CPT | Performed by: PHYSICAL THERAPIST

## 2022-05-24 NOTE — TELEPHONE ENCOUNTER
Called & spoke with Barbara spears/ [facility]. I informed her I was returning a call I received from a lady named Vicki Beverly informed Abel Berman that the Patient's MRI was faxed over to [facility] in [facility]. Abel Berman was provided the Auth# I received from our pre-cert department and she said a messaged was left in the patient's chart.

## 2022-05-24 NOTE — FLOWSHEET NOTE
[facility],  [address]  Phone: [phone]  Fax [phone]    Physical Therapy Treatment Note/ Progress Report:           Date:  2022    Patient Name:  Tessa Hernandez    :  1995  MRN: [id number]  Restrictions/Precautions:    Medical/Treatment Diagnosis Information:  · Diagnosis: Lumbar strain (S39.012D)  · Treatment Diagnosis: Low back pain (U20.83)  Insurance/Certification information:  PT Insurance Information: [facility] [place]  Physician Information:   Ria Randhawa  Has the plan of care been signed (Y/N):        []  Yes  [x]  No     Date of Patient follow up with Physician: not scheduled      Is this a Progress Report:     []  Yes  [x]  No        If Yes:  Date Range for reporting period:  Beginnin22  Ending    Progress report will be due (10 Rx or 30 days whichever is less): 05       Recertification will be due (POC Duration  / 90 days whichever is less):         Visit # Insurance Allowable Auth Required   In-person 1  []  Yes []  No    Telehealth   []  Yes []  No    Total            Functional Scale: FOTO lumbar 47/100 (47%)    Date assessed:  22      Therapy Diagnosis/Practice Pattern: F      Number of Comorbidities:  [x]0     []1-2    []3+    Latex Allergy:  [x]NO      []YES  Preferred Language for Healthcare:   [x]English       []other:      Pain level:  7-8/10    SUBJECTIVE:  See eval    OBJECTIVE: See eval   Observation:    Test measurements:      RESTRICTIONS/PRECAUTIONS:     Exercises/Interventions:     Therapeutic Ex (18475) Sets/sec Reps Notes/CUES   Cat/cow 5\" 5x ea                                   Patient ed   HEP, POC, ice vs heat, posture, TA activation                                  Manual Intervention (91260)      IASTM to Right T/L paraspinals 4'     Gentle Lumbar PA's 4'     Prone hip IR stretch 2'                       NMR re-education (17720)   CUES NEEDED   Abdominal bracing 5\" 5x Needs cueing   HL TA with ADD 5\" 10x Needs cueing   HL TA with SKFO 1 5x ea R/L Needs cueing   Bridging with ADD 3\" 10x                                  Therapeutic Activity (18189)                                          HEP instruction:   Access Code: [email or web address]  URL: [email or web address]  Date: 05/24/2022  Prepared by: Tiffany Argueta    Therapeutic Exercise and NMR EXR  [x] (07455) Provided verbal/tactile cueing for activities related to strengthening, flexibility, endurance, ROM  for improvements in proximal hip and core control with self care, mobility, lifting and ambulation. [x] (67321) Provided verbal/tactile cueing for activities related to improving balance, coordination, kinesthetic sense, posture, motor skill, proprioception  to assist with core control in self care, mobility, lifting, and ambulation.      Therapeutic Activities:    [] (75707 or 01720) Provided verbal/tactile cueing for activities related to improving balance, coordination, kinesthetic sense, posture, motor skill, proprioception and motor activation to allow for proper function  with self care and ADLs  [] (25801) Provided training and instruction to the patient for proper core and proximal hip recruitment and positioning with ambulation re-education     Home Exercise Program:    [x] (96500) Reviewed/Progressed HEP activities related to strengthening, flexibility, endurance, ROM of core, proximal hip and LE for functional self-care, mobility, lifting and ambulation   [] (93128) Reviewed/Progressed HEP activities related to improving balance, coordination, kinesthetic sense, posture, motor skill, proprioception of core, proximal hip and LE for self care, mobility, lifting, and ambulation      Manual Treatments:  PROM / STM / Oscillations-Mobs:  G-I, II, III, IV (PA's, Inf., Post.)  [x] (23079) Provided manual therapy to mobilize proximal hip and LS spine soft tissue/joints for the purpose of modulating pain, promoting relaxation,  increasing ROM, reducing/eliminating soft tissue swelling/inflammation/restriction, improving soft tissue extensibility and allowing for proper ROM for normal function with self care, mobility, lifting and ambulation. Modalities:   Declined modality this date    Charges  Timed Code Treatment Minutes: 25'   Total Treatment Minutes: 39'     [x] EVAL (LOW) 66990   [] EVAL (MOD) 14289   [] EVAL (HIGH) 72282   [] RE-EVAL     [] CC(27291) x     [] IONTO  [x] NMR (69971) x 1    [] VASO  [x] Manual (39647) x  1    [] Other:  [] TA x      [] Mech Traction (25125)  [] ES(attended) (99509)      [] ES (un) (22062):     GOALS:  Patient stated goal: \"Eliminate pain. \"  [] Progressing: [] Met: [] Not Met: [] Adjusted    Therapist goals for Patient:   Short Term Goals: To be achieved in: 2 weeks  1. Independent in HEP and progression per patient tolerance, in order to prevent re-injury. [] Progressing: [] Met: [] Not Met: [] Adjusted  2. Patient will have a decrease in pain to facilitate improvement in movement, function, and ADLs as indicated by Functional Deficits. [] Progressing: [] Met: [] Not Met: [] Adjusted      Long Term Goals: To be achieved in: 8 weeks  1. Disability index score of 66% or more per FOTO to assist with reaching prior level of function. [] Progressing: [] Met: [] Not Met: [] Adjusted  2. Patient will demonstrate increased AROM to WNL, good LS mobility, good hip ROM to allow for proper joint functioning as indicated by patients Functional Deficits. [] Progressing: [] Met: [] Not Met: [] Adjusted  3. Patient will demonstrate an increase in Strength to good proximal hip and core activation to allow for proper functional mobility as indicated by patients Functional Deficits. [] Progressing: [] Met: [] Not Met: [] Adjusted  4. Patient will be able to transfer sit <> stand consistently without increased symptoms or restriction.    [] Progressing: [] Met: [] Not Met: [] Adjusted  5. Patient will be able to ambulate  (patient specific functional goal)    [] Progressing: [] Met: [] Not Met: [] Adjusted     Progression Towards Functional goals:  [] Patient is progressing as expected towards functional goals listed. [] Progression is slowed due to complexities listed. [] Progression has been slowed due to co-morbidities. [x] Plan just implemented, too soon to assess goals progression  [] Other:     Overall Progression Towards Functional goals/ Treatment Progress Update:  [] Patient is progressing as expected towards functional goals listed. [] Progression is slowed due to complexities/Impairments listed. [] Progression has been slowed due to co-morbidities.   [x] Plan just implemented, too soon to assess goals progression <30days   [] Goals require adjustment due to lack of progress  [] Patient is not progressing as expected and requires additional follow up with physician  [] Other    Prognosis for POC: [x] Good [] Fair  [] Poor      Patient requires continued skilled intervention: [x] Yes  [] No    Treatment/Activity Tolerance:  [x] Patient able to complete treatment  [] Patient limited by fatigue  [] Patient limited by pain    [] Patient limited by other medical complications  [] Other:     ASSESSMENT:  See eval     Return to Play: (if applicable)   []  Stage 1: Intro to Strength   []  Stage 2: Return to Run and Strength   []  Stage 3: Return to Jump and Strength   []  Stage 4: Dynamic Strength and Agility   []  Stage 5: Sport Specific Training     []  Ready to Return to Play, Meets All Above Stages   []  Not Ready for Return to Sports   Comments:                               PLAN: See eval  [] Continue per plan of care [] Alter current plan (see comments above)  [x] Plan of care initiated [] Hold pending MD visit [] Discharge      Electronically signed by:  Lisa Lowery, PT, DPT [id number]     Note: If patient does not return for scheduled/ recommended follow up visits, this note will serve as a discharge from care along with most recent update on progress.
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
## 2022-05-31 ENCOUNTER — OFFICE VISIT (OUTPATIENT)
Dept: ORTHOPEDIC SURGERY | Age: 27
End: 2022-05-31
Payer: COMMERCIAL

## 2022-05-31 VITALS — WEIGHT: 196 LBS | BODY MASS INDEX: 25.15 KG/M2 | HEIGHT: 74 IN

## 2022-05-31 DIAGNOSIS — R20.0 RIGHT ARM NUMBNESS: Primary | ICD-10-CM

## 2022-05-31 DIAGNOSIS — S16.1XXD STRAIN OF NECK MUSCLE, SUBSEQUENT ENCOUNTER: ICD-10-CM

## 2022-05-31 PROCEDURE — 99214 OFFICE O/P EST MOD 30 MIN: CPT | Performed by: PHYSICIAN ASSISTANT
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
## 2022-06-02 ENCOUNTER — HOSPITAL ENCOUNTER (OUTPATIENT)
Dept: PHYSICAL THERAPY | Age: 27
Setting detail: THERAPIES SERIES
Discharge: HOME | End: 2022-06-02
Payer: COMMERCIAL

## 2022-06-02 PROCEDURE — 97110 THERAPEUTIC EXERCISES: CPT | Performed by: PHYSICAL THERAPIST

## 2022-06-02 PROCEDURE — 97140 MANUAL THERAPY 1/> REGIONS: CPT | Performed by: PHYSICAL THERAPIST

## 2022-06-02 PROCEDURE — 97112 NEUROMUSCULAR REEDUCATION: CPT | Performed by: PHYSICAL THERAPIST

## 2022-06-02 NOTE — FLOWSHEET NOTE
[facility], [address] [place]  Phone: [phone]  Fax [phone]    Physical Therapy Treatment Note/ Progress Report:           Date:  2022    Patient Name:  Tona Vásquez    :  1995  MRN: [id number]  Restrictions/Precautions:    Medical/Treatment Diagnosis Information:  · Diagnosis: Lumbar strain (S39.012D)  · Treatment Diagnosis: Low back pain (E80.54)  Insurance/Certification information:  PT Insurance Information: Medical [place]  Physician Information:   Jackie Son  Has the plan of care been signed (Y/N):        [x]  Yes  []  No     Date of Patient follow up with Physician: not scheduled      Is this a Progress Report:     []  Yes  [x]  No        If Yes:  Date Range for reporting period:  Beginnin22  Ending    Progress report will be due (10 Rx or 30 days whichever is less):        Recertification will be due (POC Duration  / 90 days whichever is less):         Visit # Insurance Allowable Auth Required   In-person 2  []  Yes []  No    Telehealth   []  Yes []  No    Total            Functional Scale: FOTO lumbar 47/100 (47%)    Date assessed:  22      Therapy Diagnosis/Practice Pattern: F      Number of Comorbidities:  [x]0     []1-2    []3+    Latex Allergy:  [x]NO      []YES  Preferred Language for Healthcare:   [x]English       []other:      Pain level:  6/10    SUBJECTIVE:  Patient reports his back is doing better overall. Has not been having pain in the back of his leg much the past 2-3 days. Hurts the most when he is sitting.      OBJECTIVE: See eval   Observation:    Test measurements:  NT this date    RESTRICTIONS/PRECAUTIONS:     Exercises/Interventions:     Therapeutic Ex (41917) Sets/sec Reps Notes/CUES   Cat/cow 5\" 10x ea                 Clamshells 3\" 15x ea R/L    SLR abd 1 15x ea R/L    HL Sciatic nerve glide 1 15x ea R/L    Wall sits 3\" 10x Cueing for TA activation   Patient ed   HEP, POC, ice vs heat, posture, TA activation                                  Manual Intervention (99154)      IASTM to Right T/L paraspinals 5'     Gentle Lumbar PA's 5'     Prone hip IR stretch 2'     Prone quad stretch B 3'                 NMR re-education (85130)   CUES NEEDED   Abdominal bracing 5\" 5x Needs cueing   HL TA with ABD 5\" 10x Needs cueing   Needs cueing   Bridging with ADD/ABD 3\" 10x ea    HL TA with march 3\" 5x ea R/L                            Therapeutic Activity (77842)                                          HEP instruction:   Access Code: [email or web address]  URL: [email or web address]  Date: 05/24/2022  Prepared by: Heaven Romero    Therapeutic Exercise and NMR EXR  [x] (82603) Provided verbal/tactile cueing for activities related to strengthening, flexibility, endurance, ROM  for improvements in proximal hip and core control with self care, mobility, lifting and ambulation. [x] (24231) Provided verbal/tactile cueing for activities related to improving balance, coordination, kinesthetic sense, posture, motor skill, proprioception  to assist with core control in self care, mobility, lifting, and ambulation.      Therapeutic Activities:    [] (06023 or 36881) Provided verbal/tactile cueing for activities related to improving balance, coordination, kinesthetic sense, posture, motor skill, proprioception and motor activation to allow for proper function  with self care and ADLs  [] (84928) Provided training and instruction to the patient for proper core and proximal hip recruitment and positioning with ambulation re-education     Home Exercise Program:    [x] (79558) Reviewed/Progressed HEP activities related to strengthening, flexibility, endurance, ROM of core, proximal hip and LE for functional self-care, mobility, lifting and ambulation   [] (74366) Reviewed/Progressed HEP activities related to improving balance, coordination, kinesthetic sense, posture, motor skill, proprioception of core, proximal hip and LE for self care, mobility, lifting, and ambulation      Manual Treatments:  PROM / STM / Oscillations-Mobs:  G-I, II, III, IV (PA's, Inf., Post.)  [x] (54043) Provided manual therapy to mobilize proximal hip and LS spine soft tissue/joints for the purpose of modulating pain, promoting relaxation,  increasing ROM, reducing/eliminating soft tissue swelling/inflammation/restriction, improving soft tissue extensibility and allowing for proper ROM for normal function with self care, mobility, lifting and ambulation. Modalities:   Declined modality this date    Charges  Timed Code Treatment Minutes: 40'   Total Treatment Minutes: 36'     [] EVAL (LOW) 02426   [] EVAL (MOD) 86513   [] EVAL (HIGH) 28598   [] RE-EVAL     [x] OL(24887) x 1    [] IONTO  [x] NMR (48350) x 1    [] VASO  [x] Manual (78128) x  1    [] Other:  [] TA x      [] Mech Traction (36572)  [] ES(attended) (68261)      [] ES (un) (74688):     GOALS:  Patient stated goal: \"Eliminate pain. \"  [] Progressing: [] Met: [] Not Met: [] Adjusted    Therapist goals for Patient:   Short Term Goals: To be achieved in: 2 weeks  1. Independent in HEP and progression per patient tolerance, in order to prevent re-injury. [] Progressing: [x] Met: [] Not Met: [] Adjusted  2. Patient will have a decrease in pain to facilitate improvement in movement, function, and ADLs as indicated by Functional Deficits. [x] Progressing: [] Met: [] Not Met: [] Adjusted      Long Term Goals: To be achieved in: 8 weeks  1. Disability index score of 66% or more per FOTO to assist with reaching prior level of function. [] Progressing: [] Met: [] Not Met: [] Adjusted  2. Patient will demonstrate increased AROM to WNL, good LS mobility, good hip ROM to allow for proper joint functioning as indicated by patients Functional Deficits. [] Progressing: [] Met: [] Not Met: [] Adjusted  3.  Patient will demonstrate an increase in Strength to good proximal hip and core activation to allow for proper functional mobility as indicated by patients Functional Deficits. [] Progressing: [] Met: [] Not Met: [] Adjusted  4. Patient will be able to transfer sit <> stand consistently without increased symptoms or restriction. [] Progressing: [] Met: [] Not Met: [] Adjusted  5. Patient will be able to ambulate  (patient specific functional goal)    [] Progressing: [] Met: [] Not Met: [] Adjusted     Progression Towards Functional goals:  [] Patient is progressing as expected towards functional goals listed. [] Progression is slowed due to complexities listed. [] Progression has been slowed due to co-morbidities. [x] Plan just implemented, too soon to assess goals progression  [] Other:     Overall Progression Towards Functional goals/ Treatment Progress Update:  [] Patient is progressing as expected towards functional goals listed. [] Progression is slowed due to complexities/Impairments listed. [] Progression has been slowed due to co-morbidities. [x] Plan just implemented, too soon to assess goals progression <30days   [] Goals require adjustment due to lack of progress  [] Patient is not progressing as expected and requires additional follow up with physician  [] Other    Prognosis for POC: [x] Good [] Fair  [] Poor      Patient requires continued skilled intervention: [x] Yes  [] No    Treatment/Activity Tolerance:  [x] Patient able to complete treatment  [] Patient limited by fatigue  [] Patient limited by pain    [] Patient limited by other medical complications  [] Other:     ASSESSMENT:  Patient with improved activation of TA noted as compared to initial visit. Needs cueing for breathing throughout session. Fatigued with all TE.                     PLAN: See eval  [x] Continue per plan of care [] Alter current plan (see comments above)  [] Plan of care initiated [] Hold pending MD visit [] Discharge      Electronically signed by:  Rusty Hughes PT, DPT [id number]     Note: If patient does not return for scheduled/ recommended follow up visits, this note will serve as a discharge from care along with most recent update on progress.

## 2022-06-03 ENCOUNTER — TELEPHONE (OUTPATIENT)
Dept: ORTHOPEDIC SURGERY | Age: 27
End: 2022-06-03

## 2022-06-03 NOTE — TELEPHONE ENCOUNTER
Notified Samreen oDdd [id number] -     Mail completed form to [facility] [place] at:    [address] [place] [address], [address]

## 2022-06-09 ENCOUNTER — HOSPITAL ENCOUNTER (OUTPATIENT)
Dept: PHYSICAL THERAPY | Age: 27
Setting detail: THERAPIES SERIES
Discharge: HOME | End: 2022-06-09
Payer: COMMERCIAL

## 2022-06-09 PROCEDURE — 97140 MANUAL THERAPY 1/> REGIONS: CPT | Performed by: PHYSICAL THERAPIST

## 2022-06-09 PROCEDURE — 97112 NEUROMUSCULAR REEDUCATION: CPT | Performed by: PHYSICAL THERAPIST

## 2022-06-09 PROCEDURE — 97110 THERAPEUTIC EXERCISES: CPT | Performed by: PHYSICAL THERAPIST

## 2022-06-09 NOTE — FLOWSHEET NOTE
[facility], [address]  Phone: [phone]  Fax [phone]    Physical Therapy Treatment Note/ Progress Report:           Date:  2022    Patient Name:  Alisson Vásquez    :  1995  MRN: [id number]  Restrictions/Precautions:    Medical/Treatment Diagnosis Information:  · Diagnosis: Lumbar strain (S39.012D)  · Treatment Diagnosis: Low back pain (P95.44)  Insurance/Certification information:  PT Insurance Information: Medical [place]  Physician Information:   Jim Jimenez  Has the plan of care been signed (Y/N):        [x]  Yes  []  No     Date of Patient follow up with Physician: not scheduled      Is this a Progress Report:     []  Yes  [x]  No        If Yes:  Date Range for reporting period:  Beginnin22  Ending    Progress report will be due (10 Rx or 30 days whichever is less):        Recertification will be due (POC Duration  / 90 days whichever is less):         Visit # Insurance Allowable Auth Required   In-person 3  []  Yes []  No    Telehealth   []  Yes []  No    Total            Functional Scale: FOTO lumbar 47/100 (47%)    Date assessed:  22      Therapy Diagnosis/Practice Pattern: F      Number of Comorbidities:  [x]0     []1-2    []3+    Latex Allergy:  [x]NO      []YES  Preferred Language for Healthcare:   [x]English       []other:      Pain level:  6/10    SUBJECTIVE:  Patient reports he has not had any pain in his leg except one episode when he bent over. Reports that his back is still painful when he sits or stays in one position.      OBJECTIVE:    Observation:    Test measurements:  Prone knee flexion test +, supine to sit +, standing flexion +    RESTRICTIONS/PRECAUTIONS:     Exercises/Interventions:     Therapeutic Ex (44236) Sets/sec Reps Notes/CUES   Albin pose to cobra   5x    Prone prop 15\" 3x       Prone TA with GS 10\" 10x                   Wall sits 3\" 15x Cueing for TA activation   Patient ed   HEP, POC, ice vs heat, posture, TA activation    LBW GVL 2 laps 10'                            Manual Intervention (35245)      IASTM to Right T/L paraspinals 5'     lumbopelvic manip 3'     Gentle Lumbar PA's 5'         Prone quad stretch B 3'                 NMR re-education (01925)   CUES NEEDED   Needs cueing   HL TA with ABD 5\" 10x Needs cueing   Needs cueing   Bridging with ADD/ABD 3\" 10x ea    HL TA with march 3\" 5x ea R/L    Pallof press 1 10x R/L Blue TB   GTB rows 3\" 2 x 10 Feet staggered               Therapeutic Activity (88377)                                          HEP instruction:   Access Code: [email or web address]  URL: [email or web address]  Date: 05/24/2022  Prepared by: Traci Duque    Therapeutic Exercise and NMR EXR  [x] (05212) Provided verbal/tactile cueing for activities related to strengthening, flexibility, endurance, ROM  for improvements in proximal hip and core control with self care, mobility, lifting and ambulation. [x] (91998) Provided verbal/tactile cueing for activities related to improving balance, coordination, kinesthetic sense, posture, motor skill, proprioception  to assist with core control in self care, mobility, lifting, and ambulation.      Therapeutic Activities:    [] (30607 or 82473) Provided verbal/tactile cueing for activities related to improving balance, coordination, kinesthetic sense, posture, motor skill, proprioception and motor activation to allow for proper function  with self care and ADLs  [] (26422) Provided training and instruction to the patient for proper core and proximal hip recruitment and positioning with ambulation re-education     Home Exercise Program:    [x] (21490) Reviewed/Progressed HEP activities related to strengthening, flexibility, endurance, ROM of core, proximal hip and LE for functional self-care, mobility, lifting and ambulation   [] (81234) Reviewed/Progressed HEP activities related to improving balance, coordination, kinesthetic sense, posture, motor skill, proprioception of core, proximal hip and LE for self care, mobility, lifting, and ambulation      Manual Treatments:  PROM / STM / Oscillations-Mobs:  G-I, II, III, IV (PA's, Inf., Post.)  [x] (15506) Provided manual therapy to mobilize proximal hip and LS spine soft tissue/joints for the purpose of modulating pain, promoting relaxation,  increasing ROM, reducing/eliminating soft tissue swelling/inflammation/restriction, improving soft tissue extensibility and allowing for proper ROM for normal function with self care, mobility, lifting and ambulation. Modalities:   Declined modality this date    Charges  Timed Code Treatment Minutes: 39'   Total Treatment Minutes: 39'     [] EVAL (LOW) 99911   [] EVAL (MOD) 05454   [] EVAL (HIGH) 78620   [] RE-EVAL     [x] ZN(03133) x 1    [] IONTO  [x] NMR (10127) x 1    [] VASO  [x] Manual (06901) x  1    [] Other:  [] TA x      [] Mech Traction (00973)  [] ES(attended) (87520)      [] ES (un) (79589):     GOALS:  Patient stated goal: \"Eliminate pain. \"  [] Progressing: [] Met: [] Not Met: [] Adjusted    Therapist goals for Patient:   Short Term Goals: To be achieved in: 2 weeks  1. Independent in HEP and progression per patient tolerance, in order to prevent re-injury. [] Progressing: [x] Met: [] Not Met: [] Adjusted  2. Patient will have a decrease in pain to facilitate improvement in movement, function, and ADLs as indicated by Functional Deficits. [x] Progressing: [] Met: [] Not Met: [] Adjusted      Long Term Goals: To be achieved in: 8 weeks  1. Disability index score of 66% or more per FOTO to assist with reaching prior level of function. [] Progressing: [] Met: [] Not Met: [] Adjusted  2. Patient will demonstrate increased AROM to WNL, good LS mobility, good hip ROM to allow for proper joint functioning as indicated by patients Functional Deficits. [] Progressing: [] Met: [] Not Met: [] Adjusted  3. Patient will demonstrate an increase in Strength to good proximal hip and core activation to allow for proper functional mobility as indicated by patients Functional Deficits. [] Progressing: [] Met: [] Not Met: [] Adjusted  4. Patient will be able to transfer sit <> stand consistently without increased symptoms or restriction. [] Progressing: [] Met: [] Not Met: [] Adjusted  5. Patient will be able to ambulate  (patient specific functional goal)    [] Progressing: [] Met: [] Not Met: [] Adjusted     Progression Towards Functional goals:  [] Patient is progressing as expected towards functional goals listed. [] Progression is slowed due to complexities listed. [] Progression has been slowed due to co-morbidities. [x] Plan just implemented, too soon to assess goals progression  [] Other:     Overall Progression Towards Functional goals/ Treatment Progress Update:  [] Patient is progressing as expected towards functional goals listed. [] Progression is slowed due to complexities/Impairments listed. [] Progression has been slowed due to co-morbidities. [x] Plan just implemented, too soon to assess goals progression <30days   [] Goals require adjustment due to lack of progress  [] Patient is not progressing as expected and requires additional follow up with physician  [] Other    Prognosis for POC: [x] Good [] Fair  [] Poor      Patient requires continued skilled intervention: [x] Yes  [] No    Treatment/Activity Tolerance:  [x] Patient able to complete treatment  [] Patient limited by fatigue  [] Patient limited by pain    [] Patient limited by other medical complications  [] Other:     ASSESSMENT:  Patient presents with lumbopelvic dysfunction this date. Improved with prone knee flexion test and supine to sit after manip. Patient continues to demonstrate generalized weakness and is challenged by all TE.                    PLAN: See eval  [x] Continue per plan of care [] Alter current plan (see comments above)  [] Plan of care initiated [] Hold pending MD visit [] Discharge      Electronically signed by:  Vijay Otrez PT, DPT [id number]     Note: If patient does not return for scheduled/ recommended follow up visits, this note will serve as a discharge from care along with most recent update on progress.

## 2022-06-16 ENCOUNTER — HOSPITAL ENCOUNTER (OUTPATIENT)
Dept: PHYSICAL THERAPY | Age: 27
Setting detail: THERAPIES SERIES
Discharge: HOME | End: 2022-06-16
Payer: COMMERCIAL

## 2022-06-16 PROCEDURE — 97110 THERAPEUTIC EXERCISES: CPT | Performed by: PHYSICAL THERAPIST

## 2022-06-16 PROCEDURE — 97112 NEUROMUSCULAR REEDUCATION: CPT | Performed by: PHYSICAL THERAPIST

## 2022-06-16 PROCEDURE — 97140 MANUAL THERAPY 1/> REGIONS: CPT | Performed by: PHYSICAL THERAPIST

## 2022-06-16 NOTE — PLAN OF CARE
[facility], [address] [place] [address]  Phone: [phone]  Fax [phone]  Physical Therapy Re-Certification Plan of Care/MD UPDATE      Dear Tejas Hilliard  ,    We had the pleasure of treating the following patient for physical therapy services at [address] [place] [address]. A summary of our findings can be found in the updated assessment below. This includes our plan of care. If you have any questions or concerns regarding these findings, please do not hesitate to contact me at the office phone number checked above.   Thank you for the referral.     Physician Signature:________________________________Date:__________________  By signing above (or electronic signature), therapists plan is approved by physician    Date Range Of Visits: 22 - 22  Total Visits to Date: 5  Overall Response to Treatment:   [x]Patient is responding well to treatment and improvement is noted with regards  to goals   []Patient should continue to improve in reasonable time if they continue HEP   []Patient has plateaued and is no longer responding to skilled PT intervention    []Patient is getting worse and would benefit from return to referring MD   []Patient unable to adhere to initial POC   []Other:         Physical Therapy Treatment Note/ Progress Report:           Date:  2022    Patient Name:  Amanda Sylvester    :  1995  MRN: [id number]  Restrictions/Precautions:    Medical/Treatment Diagnosis Information:  · Diagnosis: Lumbar strain (S39.012D)  · Treatment Diagnosis: Low back pain (M00.30)  Insurance/Certification information:  PT Insurance Information: Medical [place]  Physician Information:   Tejas Hilliard  Has the plan of care been signed (Y/N):        [x]  Yes  []  No     Date of Patient follow up with Physician: not scheduled      Is this a Progress Report:     [x]  Randolph Gao (see above)  []  No        If Yes: Date Range for reporting period:  Beginnin22  Ending    Progress report will be due (10 Rx or 30 days whichever is less): 3/89/26       Recertification will be due (POC Duration  / 90 days whichever is less):         Visit # Insurance Allowable Auth Required   In-person 5  []  Yes []  No    Telehealth   []  Yes []  No    Total            Functional Scale: FOTO lumbar 47/100 (47%)    Date assessed:  22      Therapy Diagnosis/Practice Pattern: F      Number of Comorbidities:  [x]0     []1-2    []3+    Latex Allergy:  [x]NO      []YES  Preferred Language for Healthcare:   [x]English       []other:      Pain level:  0-5/10    SUBJECTIVE:  Patient reports he was a little sore after last session. Had to stand a lot last weekend and had some pain down his leg, but overall seeing improvement.  Feels like he is about 70% back to normal.    OBJECTIVE:    Observation:    Test measurements:  Prone knee flexion test -, standing flexion -, supine to sit - ; R hip abduction 4/5, left hip abduction 4-/5, R hip ER 4+/5, left hip ER 4/5; Lumbar flexion 45 deg, ext WNL    RESTRICTIONS/PRECAUTIONS:     Exercises/Interventions:     Therapeutic Ex (17691) Sets/sec Reps Notes/CUES   Albin pose  20\" 3x R/L/N   Prone press up 5\" 10x       Prone TA with GS 10\" 10x                   Cueing for TA activation   Patient ed   HEP, POC, ice vs heat, posture, TA activation    LBW GVL 3 laps 10'          plank 1 20\" Mild pain               Manual Intervention (35023)      IASTM to Right T/L paraspinals 5'         Gentle Lumbar PA's 5'         Prone quad stretch B 3'                 NMR re-education (50422)   CUES NEEDED   Needs cueing   Needs cueing   HL TA with SKFO 1 10x ea R/L Needs cueing   Bridging with ADD/ABD 5\" 15x ea    HL TA with march 3\" 10x ea R/L    Blue TB   GTB rows 3\" 2 x 10 Feet staggered   Quadruped alternating arms, leg 3\" 5x ea R/L          Therapeutic Activity (61779)                                          HEP instruction:   Access Code: [email or web address] Early: [email or web address]  Date: 05/24/2022  Prepared by: Sheila Olea    Therapeutic Exercise and NMR EXR  [x] (35100) Provided verbal/tactile cueing for activities related to strengthening, flexibility, endurance, ROM  for improvements in proximal hip and core control with self care, mobility, lifting and ambulation. [x] (20039) Provided verbal/tactile cueing for activities related to improving balance, coordination, kinesthetic sense, posture, motor skill, proprioception  to assist with core control in self care, mobility, lifting, and ambulation. Therapeutic Activities:    [] (68275 or 89462) Provided verbal/tactile cueing for activities related to improving balance, coordination, kinesthetic sense, posture, motor skill, proprioception and motor activation to allow for proper function  with self care and ADLs  [] (88354) Provided training and instruction to the patient for proper core and proximal hip recruitment and positioning with ambulation re-education     Home Exercise Program:    [x] (89783) Reviewed/Progressed HEP activities related to strengthening, flexibility, endurance, ROM of core, proximal hip and LE for functional self-care, mobility, lifting and ambulation   [] (66391) Reviewed/Progressed HEP activities related to improving balance, coordination, kinesthetic sense, posture, motor skill, proprioception of core, proximal hip and LE for self care, mobility, lifting, and ambulation      Manual Treatments:  PROM / STM / Oscillations-Mobs:  G-I, II, III, IV (PA's, Inf., Post.)  [x] (19977) Provided manual therapy to mobilize proximal hip and LS spine soft tissue/joints for the purpose of modulating pain, promoting relaxation,  increasing ROM, reducing/eliminating soft tissue swelling/inflammation/restriction, improving soft tissue extensibility and allowing for proper ROM for normal function with self care, mobility, lifting and ambulation. Modalities:   Declined modality this date    Charges  Timed Code Treatment Minutes: 50'   Total Treatment Minutes: 50'     [] EVAL (LOW) 17188   [] EVAL (MOD) 69402   [] EVAL (HIGH) 78839   [] RE-EVAL     [x] OC(54345) x 1    [] IONTO  [x] NMR (23792) x 1    [] VASO  [x] Manual (31869) x  1    [] Other:  [] TA x      [] Mech Traction (77417)  [] ES(attended) (48998)      [] ES (un) (72359):     GOALS: Updated LTG 3 and 5 this date (see below)  Patient stated goal: \"Eliminate pain. \"  [] Progressing: [] Met: [] Not Met: [] Adjusted    Therapist goals for Patient:   Short Term Goals: To be achieved in: 2 weeks  1. Independent in HEP and progression per patient tolerance, in order to prevent re-injury. [] Progressing: [x] Met: [] Not Met: [] Adjusted  2. Patient will have a decrease in pain to facilitate improvement in movement, function, and ADLs as indicated by Functional Deficits. [x] Progressing: [] Met: [] Not Met: [] Adjusted      Long Term Goals: To be achieved in: 6 more weeks from 6/16/22 (  1. Disability index score of 66% or more per FOTO to assist with reaching prior level of function. [] Progressing: [] Met: [] Not Met: [] Adjusted  2. Patient will demonstrate increased AROM to WNL, good LS mobility, good hip ROM to allow for proper joint functioning as indicated by patients Functional Deficits. [x] Progressing: [] Met: [] Not Met: [] Adjusted  3. Patient will demonstrate an increase in Strength in bilateral hip abduction and ER to 4+/5 and good core activation to allow for proper functional mobility as indicated by patients Functional Deficits. [] Progressing: [] Met: [] Not Met: [] Adjusted  4. Patient will be able to transfer sit <> stand consistently without increased symptoms or restriction. [x] Progressing: [] Met: [] Not Met: [] Adjusted  5.  Patient will be able to ambulate for 30 minutes without increased symptoms or restrictions. (patient specific functional goal)    [] Progressing: [] Met: [] Not Met: [] Adjusted     Progression Towards Functional goals:  [x] Patient is progressing as expected towards functional goals listed. [] Progression is slowed due to complexities listed. [] Progression has been slowed due to co-morbidities. [] Plan just implemented, too soon to assess goals progression  [] Other:     Overall Progression Towards Functional goals/ Treatment Progress Update:  [x] Patient is progressing as expected towards functional goals listed. [] Progression is slowed due to complexities/Impairments listed. [] Progression has been slowed due to co-morbidities. [] Plan just implemented, too soon to assess goals progression <30days   [] Goals require adjustment due to lack of progress  [] Patient is not progressing as expected and requires additional follow up with physician  [] Other    Prognosis for POC: [x] Good [] Fair  [] Poor      Patient requires continued skilled intervention: [x] Yes  [] No    Treatment/Activity Tolerance:  [x] Patient able to complete treatment  [] Patient limited by fatigue  [] Patient limited by pain    [] Patient limited by other medical complications  [] Other:     ASSESSMENT:  Patient with improved AROM of lumbar spine and improved hip strength and core activation as compared to initial visit. Continues to fatigue with all TE, especially higher level core activities. Patient with minimal to no leg symptoms and he can perform more activity before his pain comes on. Would benefit from continued skilled PT to work on hip and core endurance. PLAN: See cate  [x] Continue per plan of care [] Alter current plan (see comments above)  [] Plan of care initiated [] Hold pending MD visit [] Discharge      Electronically signed by:  Lea Madison PT, DPT [id number]     Note: If patient does not return for scheduled/ recommended follow up visits, this note will serve as a discharge from care along with most recent update on progress.

## 2022-06-21 ENCOUNTER — HOSPITAL ENCOUNTER (OUTPATIENT)
Dept: PHYSICAL THERAPY | Age: 27
Setting detail: THERAPIES SERIES
Discharge: HOME | End: 2022-06-21
Payer: COMMERCIAL

## 2022-06-21 PROCEDURE — 97112 NEUROMUSCULAR REEDUCATION: CPT | Performed by: PHYSICAL THERAPIST

## 2022-06-21 PROCEDURE — 97140 MANUAL THERAPY 1/> REGIONS: CPT | Performed by: PHYSICAL THERAPIST

## 2022-06-21 PROCEDURE — 97110 THERAPEUTIC EXERCISES: CPT | Performed by: PHYSICAL THERAPIST

## 2022-06-21 NOTE — FLOWSHEET NOTE
[facility], [address] [place]  Phone: [phone]  Fax [phone]    Physical Therapy Treatment Note/ Progress Report:           Date:  2022    Patient Name:  Donna Dent    :  1995  MRN: [id number]  Restrictions/Precautions:    Medical/Treatment Diagnosis Information:  · Diagnosis: Lumbar strain (S39.012D)  · Treatment Diagnosis: Low back pain (X67.75)  Insurance/Certification information:  PT Insurance Information: Medical [place]  Physician Information:   Lima Guerrero  Has the plan of care been signed (Y/N):        [x]  Yes  []  No     Date of Patient follow up with Physician: not scheduled      Is this a Progress Report:     []  Yes (see above)  [x]  No        If Yes:  Date Range for reporting period:  Beginnin22  Ending    Progress report will be due (10 Rx or 30 days whichever is less):        Recertification will be due (POC Duration  / 90 days whichever is less):         Visit # Insurance Allowable Auth Required   In-person 5 BMN []  Yes []  No    Telehealth   []  Yes []  No    Total            Functional Scale: FOTO lumbar 47/100 (47%)    Date assessed:  22      Therapy Diagnosis/Practice Pattern: F      Number of Comorbidities:  [x]0     []1-2    []3+    Latex Allergy:  [x]NO      []YES  Preferred Language for Healthcare:   [x]English       []other:      Pain level:  0-5/10    SUBJECTIVE:  Patient reports he is having a little more pain that last week. Had some in his right buttock and the back of his thigh. Has been walking a little, more but not sure if that is contributing.     OBJECTIVE:    Observation:    Test measurements: NT this date    RESTRICTIONS/PRECAUTIONS:     Exercises/Interventions:     Therapeutic Ex (47422) Sets/sec Reps Notes/CUES   Albin pose  20\" 3x R/L/N      Cat/cow 5\" 10x ea        sidelying open book stretch 10\" 5x ea R/L          HL Sciatic nerve glide 2 15x ea R/L    Cueing for TA activation   Patient ed       LBW GVL 3 laps 10'          Mild pain   Tableside HS stretch 30\" 3x ea R/L          Manual Intervention ([id number])      IASTM to Right T/L paraspinals 5'         Gentle Lumbar PA's 5'     Prone hip IR stretch 3'     Prone quad stretch R 2'                 NMR re-education (62394)   CUES NEEDED   Needs cueing   Needs cueing   HL TA with SKFO 1 5x ea R/L Needs cueing   Bridging with ABD GVL 2 15x    HL TA with march 3\" 5x ea R/L    Blue TB   Blue TB rows 3\" 2 x 10 Feet staggered   Quadruped alternating arms, leg 3\" 5x ea R/L          Therapeutic Activity (49981)                                          HEP instruction:   Access Code: [email or web address]  URL: "[email or web address]  Date: 05/24/2022  Prepared by: Aide Treadwell    Therapeutic Exercise and NMR EXR  [x] (85305) Provided verbal/tactile cueing for activities related to strengthening, flexibility, endurance, ROM  for improvements in proximal hip and core control with self care, mobility, lifting and ambulation. [x] (16945) Provided verbal/tactile cueing for activities related to improving balance, coordination, kinesthetic sense, posture, motor skill, proprioception  to assist with core control in self care, mobility, lifting, and ambulation.      Therapeutic Activities:    [] (35716 or 59836) Provided verbal/tactile cueing for activities related to improving balance, coordination, kinesthetic sense, posture, motor skill, proprioception and motor activation to allow for proper function  with self care and ADLs  [] (44274) Provided training and instruction to the patient for proper core and proximal hip recruitment and positioning with ambulation re-education     Home Exercise Program:    [x] (98126) Reviewed/Progressed HEP activities related to strengthening, flexibility, endurance, ROM of core, proximal hip and LE for functional self-care, mobility, lifting and ambulation   [] (44846) Reviewed/Progressed HEP activities related to improving balance, coordination, kinesthetic sense, posture, motor skill, proprioception of core, proximal hip and LE for self care, mobility, lifting, and ambulation      Manual Treatments:  PROM / STM / Oscillations-Mobs:  G-I, II, III, IV (PA's, Inf., Post.)  [x] (17353) Provided manual therapy to mobilize proximal hip and LS spine soft tissue/joints for the purpose of modulating pain, promoting relaxation,  increasing ROM, reducing/eliminating soft tissue swelling/inflammation/restriction, improving soft tissue extensibility and allowing for proper ROM for normal function with self care, mobility, lifting and ambulation. Modalities:   Declined modality this date    Charges  Timed Code Treatment Minutes: 39'   Total Treatment Minutes: 39'     [] EVAL (LOW) 75398   [] EVAL (MOD) 92927   [] EVAL (HIGH) 80222   [] RE-EVAL     [x] DH(34672) x 1    [] IONTO  [x] NMR (97868) x 1    [] VASO  [x] Manual (33852) x  1    [] Other:  [] TA x      [] Mech Traction (18204)  [] ES(attended) (77990)      [] ES (un) (59578):     GOALS: Updated LTG 3 and 5 this date (see below)  Patient stated goal: \"Eliminate pain. \"  [] Progressing: [] Met: [] Not Met: [] Adjusted    Therapist goals for Patient:   Short Term Goals: To be achieved in: 2 weeks  1. Independent in HEP and progression per patient tolerance, in order to prevent re-injury. [] Progressing: [x] Met: [] Not Met: [] Adjusted  2. Patient will have a decrease in pain to facilitate improvement in movement, function, and ADLs as indicated by Functional Deficits. [x] Progressing: [] Met: [] Not Met: [] Adjusted      Long Term Goals: To be achieved in: 6 more weeks from 6/16/22 (  1. Disability index score of 66% or more per FOTO to assist with reaching prior level of function. [] Progressing: [] Met: [] Not Met: [] Adjusted  2.  Patient will demonstrate increased AROM to WNL, good LS mobility, good hip ROM to allow for proper joint functioning as indicated by patients Functional Deficits. [x] Progressing: [] Met: [] Not Met: [] Adjusted  3. Patient will demonstrate an increase in Strength in bilateral hip abduction and ER to 4+/5 and good core activation to allow for proper functional mobility as indicated by patients Functional Deficits. [] Progressing: [] Met: [] Not Met: [] Adjusted  4. Patient will be able to transfer sit <> stand consistently without increased symptoms or restriction. [x] Progressing: [] Met: [] Not Met: [] Adjusted  5. Patient will be able to ambulate for 30 minutes without increased symptoms or restrictions. (patient specific functional goal)    [] Progressing: [] Met: [] Not Met: [] Adjusted     Progression Towards Functional goals:  [x] Patient is progressing as expected towards functional goals listed. [] Progression is slowed due to complexities listed. [] Progression has been slowed due to co-morbidities. [] Plan just implemented, too soon to assess goals progression  [] Other:     Overall Progression Towards Functional goals/ Treatment Progress Update:  [x] Patient is progressing as expected towards functional goals listed. [] Progression is slowed due to complexities/Impairments listed. [] Progression has been slowed due to co-morbidities. [] Plan just implemented, too soon to assess goals progression <30days   [] Goals require adjustment due to lack of progress  [] Patient is not progressing as expected and requires additional follow up with physician  [] Other    Prognosis for POC: [x] Good [] Fair  [] Poor      Patient requires continued skilled intervention: [x] Yes  [] No    Treatment/Activity Tolerance:  [x] Patient able to complete treatment  [] Patient limited by fatigue  [] Patient limited by pain    [] Patient limited by other medical complications  [] Other:     ASSESSMENT:  Patient with significant tightness and neural tension in bilateral hamstrings.  Continues to demonstrates fatigue with all TE, especially quadruped and standing TE. PLAN: See eval  [x] Continue per plan of care [] Alter current plan (see comments above)  [] Plan of care initiated [] Hold pending MD visit [] Discharge      Electronically signed by:  Erika Barnes, PT, DPT [id number]     Note: If patient does not return for scheduled/ recommended follow up visits, this note will serve as a discharge from care along with most recent update on progress.

## 2022-06-28 ENCOUNTER — HOSPITAL ENCOUNTER (OUTPATIENT)
Dept: PHYSICAL THERAPY | Age: 27
Setting detail: THERAPIES SERIES
Discharge: HOME | End: 2022-06-28
Payer: COMMERCIAL

## 2022-06-28 PROCEDURE — 97161 PT EVAL LOW COMPLEX 20 MIN: CPT | Performed by: PHYSICAL THERAPIST

## 2022-06-28 PROCEDURE — 97140 MANUAL THERAPY 1/> REGIONS: CPT | Performed by: PHYSICAL THERAPIST

## 2022-06-28 PROCEDURE — 97110 THERAPEUTIC EXERCISES: CPT | Performed by: PHYSICAL THERAPIST
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
## 2022-06-30 ENCOUNTER — HOSPITAL ENCOUNTER (OUTPATIENT)
Dept: PHYSICAL THERAPY | Age: 27
Setting detail: THERAPIES SERIES
Discharge: HOME | End: 2022-06-30
Payer: COMMERCIAL

## 2022-06-30 PROCEDURE — 97112 NEUROMUSCULAR REEDUCATION: CPT | Performed by: PHYSICAL THERAPIST

## 2022-06-30 PROCEDURE — 97110 THERAPEUTIC EXERCISES: CPT | Performed by: PHYSICAL THERAPIST

## 2022-06-30 PROCEDURE — 97140 MANUAL THERAPY 1/> REGIONS: CPT | Performed by: PHYSICAL THERAPIST

## 2022-06-30 NOTE — FLOWSHEET NOTE
3 laps 10'          Mild pain   Tableside HS stretch 30\" 3x ea R/L          Manual Intervention ([address] [facility])      IASTM to Right T/L paraspinals 5'     Lumbar roll 3'  No cavitation   Gentle Lumbar PA's 5'                         NMR re-education (02792)   CUES NEEDED   Needs cueing   Needs cueing   Needs cueing   Bridging with ABD GVL 2 15x    HL TA with march 3\" 10x ea R/L Break at 5   Pallof press 1 15x R/L Blue TB   Feet staggered   Quadruped alternating arms  Qudaruped alternating legs 3\" 10x ea B  5x ea B    Standing TB ext with GTB + march 1 10x ea R/L Difficulty stabilizing   Therapeutic Activity (26100)                                          HEP instruction:   Access Code: [email or web address]  URL: [email or web address]  Date: 05/24/2022  Prepared by: Keanu Harding    Therapeutic Exercise and NMR EXR  [x] (27736) Provided verbal/tactile cueing for activities related to strengthening, flexibility, endurance, ROM  for improvements in proximal hip and core control with self care, mobility, lifting and ambulation. [x] (05877) Provided verbal/tactile cueing for activities related to improving balance, coordination, kinesthetic sense, posture, motor skill, proprioception  to assist with core control in self care, mobility, lifting, and ambulation.      Therapeutic Activities:    [] (71135 or 79636) Provided verbal/tactile cueing for activities related to improving balance, coordination, kinesthetic sense, posture, motor skill, proprioception and motor activation to allow for proper function  with self care and ADLs  [] (03058) Provided training and instruction to the patient for proper core and proximal hip recruitment and positioning with ambulation re-education     Home Exercise Program:    [x] (42085) Reviewed/Progressed HEP activities related to strengthening, flexibility, endurance, ROM of core, proximal hip and LE for functional self-care, mobility, lifting and ambulation   [] (51059) Reviewed/Progressed HEP initiated [] Hold pending MD visit [] Discharge      Electronically signed by:  Edilia Diaz, PT, DPT [id number]     Note: If patient does not return for scheduled/ recommended follow up visits, this note will serve as a discharge from care along with most recent update on progress.

## 2022-07-05 ENCOUNTER — HOSPITAL ENCOUNTER (OUTPATIENT)
Dept: PHYSICAL THERAPY | Age: 27
Setting detail: THERAPIES SERIES
Discharge: HOME | End: 2022-07-05
Payer: COMMERCIAL

## 2022-07-05 PROCEDURE — 97112 NEUROMUSCULAR REEDUCATION: CPT | Performed by: PHYSICAL THERAPIST

## 2022-07-05 PROCEDURE — 97110 THERAPEUTIC EXERCISES: CPT | Performed by: PHYSICAL THERAPIST

## 2022-07-05 PROCEDURE — 97140 MANUAL THERAPY 1/> REGIONS: CPT | Performed by: PHYSICAL THERAPIST

## 2022-07-05 NOTE — FLOWSHEET NOTE
[facility], [address]  Phone: [phone]  Fax [phone]      Physical Therapy Treatment Note/ Progress Report:       Date:  2022    Patient Name:  Inna Kirkpatrick    :  1995  MRN: [id number]  Restrictions/Precautions:    Medical/Treatment Diagnosis Information:  · Diagnosis: Strain of neck muscle (S16.1XXD), Cervical radiculitis (M54.12)  Treatment Diagnosis: Neck pain (M54.2), postural dysfunction (U14.8)  Insurance/Certification information:  PT Insurance Information: Medical [place]  Physician Information:   LUCRECIA Carballo  Has the plan of care been signed (Y/N):        [x]  Yes  []  No     Date of Patient follow up with Physician:     Is this a Progress Report:     []  Yes  [x]  No        If Yes:  Date Range for reporting period:  Beginnin22  Ending    Progress report will be due (10 Rx or 30 days whichever is less): 3/31/51       Recertification will be due (POC Duration  / 90 days whichever is less):         Visit # Insurance Allowable Auth Required   In-person 2 BMN []  Yes [x]  No    Telehealth   []  Yes []  No    Total        Functional Scale: FOTO neck 45/100 (45 %)    Date assessed:  22   Therapy Diagnosis/Practice Pattern: F      Number of Comorbidities:  []0     [x]1-2    []3+     Latex Allergy:  [x]NO      []YES  Preferred Language for Healthcare:   [x]English       []other:    Pain level:  4-5/10     SUBJECTIVE: Patient reports his neck is doing okay. Sleeping is the most troublesome.   OBJECTIVE:    Observation:    Test measurements:  NT this date    RESTRICTIONS/PRECAUTIONS:     Exercises/Interventions:   Therapeutic Ex        Sets/sec Reps Notes   Standing UT stretch  30\" 3x ea B    Doorway pec stretch 15\" 4x Feet staggered         No $ 3\" 2 x 10    Serratus punches 3#  3\" 2 x 10 B    SL ER 1# 1 15x ea Cueing for scap position         Patient ed   Sleeping normal mood with appropriate affect position, posture                                       Manual Intervention           STM bilateral upper traps and pec supine, lower cervical side glides, gentle manual traction, IASTM to T spine paraspinals and rhomboids, Thoracic PA's 15'                                         NMR re-education                Chin tucks supine 10\" 10x    Prone scap squeezes on SB 3\" 10x Needs cueing   Prone rows on SB 3\" 10x Needs cueing   Prone rows on SB 3\" 10x  needs cueing   Traction                                 HEP instruction:   Access Code: [email or web address]  URL: [email or web address]  Date: 06/28/2022  Prepared by: Nakia Valdez      Therapeutic Exercise and NMR EXR  [x] (39647) Provided verbal/tactile cueing for activities related to strengthening, flexibility, endurance, ROM  for improvements in cervical, postural, scapular, scapulothoracic and UE control with self care, reaching, carrying, lifting, house/yardwork, driving/computer work. [x] (85974) Provided verbal/tactile cueing for activities related to improving balance, coordination, kinesthetic sense, posture, motor skill, proprioception  to assist with cervical, scapular, scapulothoracic and UE control with self care, reaching, carrying, lifting, house/yardwork, driving/computer work. Therapeutic Activities:    [] (28488 or 18847) Provided verbal/tactile cueing for activities related to improving balance, coordination, kinesthetic sense, posture, motor skill, proprioception and motor activation to allow for proper function of cervical, scapular, scapulothoracic and UE control with self care, carrying, lifting, driving/computer work.      Home Exercise Program:    [x] (03881) Reviewed/Progressed HEP activities related to strengthening, flexibility, endurance, ROM of cervical, scapular, scapulothoracic and UE control with self care, reaching, carrying, lifting, house/yardwork, driving/computer work  [] (04982) Reviewed/Progressed HEP activities related to 50 deg B and thoracic mobility to [facility] to allow for proper joint functioning as indicated by patients Functional Deficits. [] Progressing: [] Met: [] Not Met: [] Adjusted  3. Patient will demonstrate an increase in postural awareness and control and activation of  Deep cervical stabilizers to allow for proper functional mobility as indicated by patients Functional Deficits. [] Progressing: [] Met: [] Not Met: [] Adjusted   4. Patient will be able to sleep without symptoms or restriction. [] Progressing: [] Met: [] Not Met: [] Adjusted  5. Patient will be able to drive without increased symptoms or restrictions. (patient specific functional goal) [] Progressing: [] Met: [] Not Met: [] Adjusted     Overall Progression Towards Functional goals/ Treatment Progress Update:  [] Patient is progressing as expected towards functional goals listed. [] Progression is slowed due to complexities/Impairments listed. [] Progression has been slowed due to co-morbidities. [x] Plan just implemented, too soon to assess goals progression <30days   [] Goals require adjustment due to lack of progress  [] Patient is not progressing as expected and requires additional follow up with physician  [] Other    Prognosis for POC: [x] Good [] Fair  [] Poor      Patient requires continued skilled intervention: [x] Yes  [] No      ASSESSMENT:  Patient challenged by prone scap work today, with cueing needed to avoid UT activation. Demonstrated general fatigue with all TE due to deconditioning.      Treatment/Activity Tolerance:  [x] Patient tolerated treatment well [] Patient limited by fatique  [] Patient limited by pain  [] Patient limited by other medical complications  [] Other:     Prognosis: [] Good [x] Fair  [] Poor    Patient Requires Follow-up: [x] Yes  [] No    PLAN: See eval  [x] Continue per plan of care [] Alter current plan (see comments above)  [x] Plan of care initiated [] Hold pending MD visit [] Discharge      Electronically signed by:  Liane Malcolm, PT, DPT [id number]       Note: If patient does not return for scheduled/ recommended follow up visits, this note will serve as a discharge from care along with most recent update on progress.

## 2022-07-07 ENCOUNTER — HOSPITAL ENCOUNTER (OUTPATIENT)
Dept: PHYSICAL THERAPY | Age: 27
Setting detail: THERAPIES SERIES
Discharge: HOME | End: 2022-07-07
Payer: COMMERCIAL

## 2022-07-07 PROCEDURE — 97110 THERAPEUTIC EXERCISES: CPT | Performed by: PHYSICAL THERAPIST

## 2022-07-07 PROCEDURE — 97140 MANUAL THERAPY 1/> REGIONS: CPT | Performed by: PHYSICAL THERAPIST

## 2022-07-07 PROCEDURE — 97112 NEUROMUSCULAR REEDUCATION: CPT | Performed by: PHYSICAL THERAPIST

## 2022-07-07 NOTE — FLOWSHEET NOTE
[facility], [address]  Phone: [phone]  Fax [phone]    Physical Therapy Treatment Note/ Progress Report:           Date:  2022    Patient Name:  Adam Anderson    :  1995  MRN: [id number]  Restrictions/Precautions:    Medical/Treatment Diagnosis Information:  · Diagnosis: Lumbar strain (S39.012D)  · Treatment Diagnosis: Low back pain (S39.80)  Insurance/Certification information:  PT Insurance Information: Medical [place]  Physician Information:   Pelon Kim  Has the plan of care been signed (Y/N):        [x]  Yes  []  No     Date of Patient follow up with Physician: not scheduled      Is this a Progress Report:     []  Yes (see above)  [x]  No        If Yes:  Date Range for reporting period:  Beginnin22  Ending    Progress report will be due (10 Rx or 30 days whichever is less):        Recertification will be due (POC Duration  / 90 days whichever is less):         Visit # Insurance Allowable Auth Required   In-person 7 BMN []  Yes []  No    Telehealth   []  Yes []  No    Total            Functional Scale: FOTO lumbar 63/100 (63%)    Date assessed:  22      Therapy Diagnosis/Practice Pattern: F      Number of Comorbidities:  [x]0     []1-2    []3+    Latex Allergy:  [x]NO      []YES  Preferred Language for Healthcare:   [x]English       []other:      Pain level:  0-4/10    SUBJECTIVE:  Patient reports that his back continues to feel better but still bothers him sometimes when he has been sitting for too long. OBJECTIVE:  Limited mobility in lumbar spine. Tight paraspinals.     Observation:    Test measurements:     RESTRICTIONS/PRECAUTIONS:     Exercises/Interventions:     Therapeutic Ex (20847) Sets/sec Reps Notes/CUES            sidelying open book stretch 10\" 5x ea R/L       SLR abd 1 15x ea R/L    Cueing for TA activation   Patient ed       LBW GVL 3 laps 10' activities related to improving balance, coordination, kinesthetic sense, posture, motor skill, proprioception of core, proximal hip and LE for self care, mobility, lifting, and ambulation      Manual Treatments:  PROM / STM / Oscillations-Mobs:  G-I, II, III, IV (PA's, Inf., Post.)  [x] (04633) Provided manual therapy to mobilize proximal hip and LS spine soft tissue/joints for the purpose of modulating pain, promoting relaxation,  increasing ROM, reducing/eliminating soft tissue swelling/inflammation/restriction, improving soft tissue extensibility and allowing for proper ROM for normal function with self care, mobility, lifting and ambulation. Modalities:   Declined modality this date    Charges  Timed Code Treatment Minutes: 39'   Total Treatment Minutes: 39'     [] EVAL (LOW) 89175   [] EVAL (MOD) 08224   [] EVAL (HIGH) 50731   [] RE-EVAL     [x] KW(31709) x 1    [] IONTO  [x] NMR (88833) x 1    [] VASO  [x] Manual (41497) x  1    [] Other:  [] TA x      [] Mech Traction (97817)  [] ES(attended) (09746)      [] ES (un) (34286):     GOALS: Updated LTG 3 and 5 this date (see below)  Patient stated goal: \"Eliminate pain. \"  [] Progressing: [] Met: [] Not Met: [] Adjusted    Therapist goals for Patient:   Short Term Goals: To be achieved in: 2 weeks  1. Independent in HEP and progression per patient tolerance, in order to prevent re-injury. [] Progressing: [x] Met: [] Not Met: [] Adjusted  2. Patient will have a decrease in pain to facilitate improvement in movement, function, and ADLs as indicated by Functional Deficits. [x] Progressing: [] Met: [] Not Met: [] Adjusted      Long Term Goals: To be achieved in: 6 more weeks from 6/16/22 (  1. Disability index score of 66% or more per FOTO to assist with reaching prior level of function. [] Progressing: [] Met: [] Not Met: [] Adjusted  2.  Patient will demonstrate increased AROM to WNL, good LS mobility, good hip ROM to allow for proper joint functioning as indicated by patients Functional Deficits. [x] Progressing: [] Met: [] Not Met: [] Adjusted  3. Patient will demonstrate an increase in Strength in bilateral hip abduction and ER to 4+/5 and good core activation to allow for proper functional mobility as indicated by patients Functional Deficits. [] Progressing: [] Met: [] Not Met: [] Adjusted  4. Patient will be able to transfer sit <> stand consistently without increased symptoms or restriction. [x] Progressing: [] Met: [] Not Met: [] Adjusted  5. Patient will be able to ambulate for 30 minutes without increased symptoms or restrictions. (patient specific functional goal)    [] Progressing: [] Met: [] Not Met: [] Adjusted     Progression Towards Functional goals:  [x] Patient is progressing as expected towards functional goals listed. [] Progression is slowed due to complexities listed. [] Progression has been slowed due to co-morbidities. [] Plan just implemented, too soon to assess goals progression  [] Other:     Overall Progression Towards Functional goals/ Treatment Progress Update:  [x] Patient is progressing as expected towards functional goals listed. [] Progression is slowed due to complexities/Impairments listed. [] Progression has been slowed due to co-morbidities. [] Plan just implemented, too soon to assess goals progression <30days   [] Goals require adjustment due to lack of progress  [] Patient is not progressing as expected and requires additional follow up with physician  [] Other    Prognosis for POC: [x] Good [] Fair  [] Poor      Patient requires continued skilled intervention: [x] Yes  [] No    Treatment/Activity Tolerance:  [x] Patient able to complete treatment  [] Patient limited by fatigue  [] Patient limited by pain    [] Patient limited by other medical complications  [] Other:     ASSESSMENT:  Patient is getting stronger and is increasing neuromuscular control with TA activation.  The lower thoracic spine has limited mobility but increased after manuals. Patient had more stability during upright balance activities and was able to keep his core and TA engaged. Patient demonstrates weakness in hip ABD, extension, and flexion and would benefit from skilled PT to address these limitations and increase overall strength and stability of the core and LE. PLAN: See eval  [x] Continue per plan of care [] Alter current plan (see comments above)  [] Plan of care initiated [] Hold pending MD visit [] Discharge      Electronically signed by:  David Vasquez, PT, DPT Breana Kiser SPT Therapist was present, directed the patient's care, made skilled judgement, and was responsible for assessment and treatment of the patient. Note: If patient does not return for scheduled/ recommended follow up visits, this note will serve as a discharge from care along with most recent update on progress.

## 2022-07-11 ENCOUNTER — OFFICE VISIT (OUTPATIENT)
Dept: ORTHOPEDIC SURGERY | Age: 27
End: 2022-07-11
Payer: COMMERCIAL

## 2022-07-11 VITALS — HEIGHT: 74 IN | WEIGHT: 196 LBS | BODY MASS INDEX: 25.15 KG/M2

## 2022-07-11 DIAGNOSIS — M54.12 CERVICAL RADICULITIS: Primary | ICD-10-CM

## 2022-07-11 DIAGNOSIS — S39.012A LUMBAR STRAIN, INITIAL ENCOUNTER: ICD-10-CM

## 2022-07-11 DIAGNOSIS — S16.1XXD STRAIN OF NECK MUSCLE, SUBSEQUENT ENCOUNTER: ICD-10-CM

## 2022-07-11 DIAGNOSIS — R20.0 RIGHT ARM NUMBNESS: ICD-10-CM

## 2022-07-11 PROCEDURE — 99213 OFFICE O/P EST LOW 20 MIN: CPT | Performed by: PHYSICIAN ASSISTANT

## 2022-07-11 RX ORDER — MELOXICAM 15 MG/1
15 TABLET ORAL DAILY
Qty: 30 TABLET | Refills: 0 | Status: SHIPPED | OUTPATIENT
Start: 2022-07-11 | End: 2022-08-30
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
## 2022-07-12 ENCOUNTER — HOSPITAL ENCOUNTER (OUTPATIENT)
Dept: PHYSICAL THERAPY | Age: 27
Setting detail: THERAPIES SERIES
Discharge: HOME | End: 2022-07-12
Payer: COMMERCIAL

## 2022-07-12 PROCEDURE — 97112 NEUROMUSCULAR REEDUCATION: CPT | Performed by: PHYSICAL THERAPIST

## 2022-07-12 PROCEDURE — 97140 MANUAL THERAPY 1/> REGIONS: CPT | Performed by: PHYSICAL THERAPIST

## 2022-07-12 PROCEDURE — 97110 THERAPEUTIC EXERCISES: CPT | Performed by: PHYSICAL THERAPIST

## 2022-07-12 NOTE — FLOWSHEET NOTE
[facility], [address] [place] [address]  Phone: [phone]  Fax [phone]      Physical Therapy Treatment Note/ Progress Report:       Date:  2022    Patient Name:  Ariana Charles    :  1995  MRN: [id number]  Restrictions/Precautions:    Medical/Treatment Diagnosis Information:  · Diagnosis: Strain of neck muscle (S16.1XXD), Cervical radiculitis (M54.12)  Treatment Diagnosis: Neck pain (M54.2), postural dysfunction (N26.1)  Insurance/Certification information:  PT Insurance Information: Medical [place]  Physician Information:   LUCRECIA Greene  Has the plan of care been signed (Y/N):        [x]  Yes  []  No     Date of Patient follow up with Physician:     Is this a Progress Report:     []  Yes  [x]  No        If Yes:  Date Range for reporting period:  Beginnin22  Ending    Progress report will be due (10 Rx or 30 days whichever is less): 32       Recertification will be due (POC Duration  / 90 days whichever is less):         Visit # Insurance Allowable Auth Required   In-person 3 BMN []  Yes [x]  No    Telehealth   []  Yes []  No    Total        Functional Scale: FOTO neck 45/100 (45 %)    Date assessed:  22   Therapy Diagnosis/Practice Pattern: F      Number of Comorbidities:  []0     [x]1-2    []3+     Latex Allergy:  []NO      []YES  Preferred Language for Healthcare:   [x]English       []other:    Pain level:  6/10     SUBJECTIVE:  Patient reports that his neck is bothering him when he sleeps but usually feels better when he is up and moving. States that looking down at his phone gives him discomfort in the back of his neck. OBJECTIVE: stiffness in mid-thoracic spine.     Observation: forward head posture   Test measurements:  NT this date    RESTRICTIONS/PRECAUTIONS:     Exercises/Interventions:   Therapeutic Ex        Sets/sec Reps Notes      Doorway pec stretch 30\" 3x Feet staggered         No $ YTB  3\" 2 x 10       Serratus punches on plinth  5\" 1 x 15           Lat stretch true flexion  30\" 3    Rows GTB  2 10  Easy    Quadruped Cat/cow   1 x 10                 Patient ed   Sleeping position, changing positions and postural strength/endurance   Manual Intervention           STM bilateral upper traps  IASTM to T spine paraspinals and rhomboids, Thoracic PA's 12'                                         NMR re-education          Prone shoulder HAB on SB  3\" 15x    Chin tucks supine with rotation  1  5 ea direction                 Deep neck flexor endurance    Attempted but painful    Chin tucks supine with shoulder flexion  10\" 10x    Prone rows on SB 3\" 10x Needs cueing   Prone extension on SB 3\" 10x  needs cueing   Traction                                 HEP instruction:   Access Code: [email or web address]  URL: [email or web address]  Date: 06/28/2022  Prepared by: Aramis Forde      Therapeutic Exercise and NMR EXR  [x] (19737) Provided verbal/tactile cueing for activities related to strengthening, flexibility, endurance, ROM  for improvements in cervical, postural, scapular, scapulothoracic and UE control with self care, reaching, carrying, lifting, house/yardwork, driving/computer work. [x] (66747) Provided verbal/tactile cueing for activities related to improving balance, coordination, kinesthetic sense, posture, motor skill, proprioception  to assist with cervical, scapular, scapulothoracic and UE control with self care, reaching, carrying, lifting, house/yardwork, driving/computer work. Therapeutic Activities:    [] (87283 or 32589) Provided verbal/tactile cueing for activities related to improving balance, coordination, kinesthetic sense, posture, motor skill, proprioception and motor activation to allow for proper function of cervical, scapular, scapulothoracic and UE control with self care, carrying, lifting, driving/computer work.      Home Exercise Program:    [x] (92032) Reviewed/Progressed HEP activities related to strengthening, flexibility, endurance, ROM of cervical, scapular, scapulothoracic and UE control with self care, reaching, carrying, lifting, house/yardwork, driving/computer work  [] (54105) Reviewed/Progressed HEP activities related to improving balance, coordination, kinesthetic sense, posture, motor skill, proprioception of cervical, scapular, scapulothoracic and UE control with self care, reaching, carrying, lifting, house/yardwork, driving/computer work      Manual Treatments:  PROM / STM / Oscillations-Mobs:  G-I, II, III, IV (PA's, Inf., Post.)  [x] (86439) Provided manual therapy to mobilize soft tissue/joints of cervical/CT, scapular GHJ and UE for the purpose of decreasing headache, modulating pain, promoting relaxation,  increasing ROM, reducing/eliminating soft tissue swelling/inflammation/restriction, improving soft tissue extensibility and allowing for proper ROM for normal function with self care, reaching, carrying, lifting, house/yardwork, driving/computer work    Modalities:  Declined   Charges  Timed Code Treatment Minutes: 48'   Total Treatment Minutes: 50'     [x] EVAL (LOW) 74481   [] EVAL (MOD) 18315   [] EVAL (HIGH) 58916   [] RE-EVAL     [x] NA(34999) x 1    [] IONTO  [x] NMR (94950) x   1  [] VASO  [x] Manual (83936) x 1    [] Other:  [] TA x      [] Mech Traction (07850)  [] ES(attended) (77797)      [] ES (un) (85916):     GOALS:  Patient stated goal: \"to decrease pain and get back to more activities\"  [] Progressing: [] Met: [] Not Met: [] Adjusted    Therapist goals for Patient:   Short Term Goals: To be achieved in: 2 weeks  1. Independent in HEP and progression per patient tolerance, in order to prevent re-injury. [x] Progressing: [] Met: [] Not Met: [] Adjusted  2. Patient will have a decrease in pain to facilitate improvement in movement, function, and ADLs as indicated by Functional Deficits.   [x] Progressing: [] Met: [] Not Met: [] Adjusted    Long Term Goals: To be achieved in: 8 weeks  1. Disability index score of 61% or more for the FOTO Neck to assist with reaching prior level of function. [] Progressing: [] Met: [] Not Met: [] Adjusted  2. Patient will demonstrate increased AROM to of cervical rotation to 50 deg B and thoracic mobility to [facility] to allow for proper joint functioning as indicated by patients Functional Deficits. [] Progressing: [] Met: [] Not Met: [] Adjusted  3. Patient will demonstrate an increase in postural awareness and control and activation of  Deep cervical stabilizers to allow for proper functional mobility as indicated by patients Functional Deficits. [] Progressing: [] Met: [] Not Met: [] Adjusted   4. Patient will be able to sleep without symptoms or restriction. [] Progressing: [] Met: [] Not Met: [] Adjusted  5. Patient will be able to drive without increased symptoms or restrictions. (patient specific functional goal) [] Progressing: [] Met: [] Not Met: [] Adjusted     Overall Progression Towards Functional goals/ Treatment Progress Update:  [] Patient is progressing as expected towards functional goals listed. [] Progression is slowed due to complexities/Impairments listed. [] Progression has been slowed due to co-morbidities. [x] Plan just implemented, too soon to assess goals progression <30days   [] Goals require adjustment due to lack of progress  [] Patient is not progressing as expected and requires additional follow up with physician  [] Other    Prognosis for POC: [x] Good [] Fair  [] Poor      Patient requires continued skilled intervention: [x] Yes  [] No      ASSESSMENT:  Patient demonstrated better scapular control and decrease of UT activation during prone scapular exercises but with some weakness and muscle quivering noted.  Patient experienced pain in the neck during deep neck flexor endurance exercise but was able to perform chin tucks with UE movement and cervical rotation without pain. Patient will benefit from increased mobility through the neck and thoracic spine and strength in scapular muscles and deep neck flexors. Treatment/Activity Tolerance:  [x] Patient tolerated treatment well [] Patient limited by fatique  [] Patient limited by pain  [] Patient limited by other medical complications  [] Other:     Prognosis: [] Good [x] Fair  [] Poor    Patient Requires Follow-up: [x] Yes  [] No    PLAN: See eval  [x] Continue per plan of care [] Alter current plan (see comments above)  [] Plan of care initiated [] Hold pending MD visit [] Discharge      Electronically signed by:  Deana Harp, PT, DPT Breana 44 SPT Therapist was present, directed the patient's care, made skilled judgement, and was responsible for assessment and treatment of the patient. Note: If patient does not return for scheduled/ recommended follow up visits, this note will serve as a discharge from care along with most recent update on progress.

## 2022-07-14 ENCOUNTER — HOSPITAL ENCOUNTER (OUTPATIENT)
Dept: PHYSICAL THERAPY | Age: 27
Setting detail: THERAPIES SERIES
Discharge: HOME | End: 2022-07-14
Payer: COMMERCIAL

## 2022-07-14 PROCEDURE — 97140 MANUAL THERAPY 1/> REGIONS: CPT | Performed by: PHYSICAL THERAPIST

## 2022-07-14 PROCEDURE — 97110 THERAPEUTIC EXERCISES: CPT | Performed by: PHYSICAL THERAPIST

## 2022-07-14 PROCEDURE — 97112 NEUROMUSCULAR REEDUCATION: CPT | Performed by: PHYSICAL THERAPIST

## 2022-07-14 NOTE — FLOWSHEET NOTE
[facility],  [address]  Phone: [phone]  Fax [phone]    Physical Therapy Treatment Note/ Progress Report:           Date:  2022    Patient Name:  Amalia Cagle    :  1995  MRN: [id number]  Restrictions/Precautions:    Medical/Treatment Diagnosis Information:  · Diagnosis: Lumbar strain (S39.012D)  · Treatment Diagnosis: Low back pain (A29.75)  Insurance/Certification information:  PT Insurance Information: Medical [place]  Physician Information:   Lxeus Meadows  Has the plan of care been signed (Y/N):        [x]  Yes  []  No     Date of Patient follow up with Physician: not scheduled      Is this a Progress Report:     []  Yes (see above)  [x]  No        If Yes:  Date Range for reporting period:  Beginning:   Ending    Progress report will be due (10 Rx or 30 days whichever is less):  6/15/65      Recertification will be due (POC Duration  / 90 days whichever is less):         Visit # Insurance Allowable Auth Required   In-person 8 BMN []  Yes []  No    Telehealth   []  Yes []  No    Total            Functional Scale: FOTO lumbar 63/100 (63%)    Date assessed:  22      Therapy Diagnosis/Practice Pattern: F      Number of Comorbidities:  [x]0     []1-2    []3+    Latex Allergy:  [x]NO      []YES  Preferred Language for Healthcare:   [x]English       []other:      Pain level:  3-4/10    SUBJECTIVE:  Patient reports some soreness in his back today. Reports that he mostly has soreness when he is sitting for too long and states his exercises do not bother his back. States that PT is helping.      OBJECTIVE:   PN NV  RESTRICTIONS/PRECAUTIONS:     Exercises/Interventions:     Therapeutic Ex (36025) Sets/sec Reps Notes/CUES   Hand/heel rock 5\" 10x Break at 5      Cat/cow 5\" 10x ea              Cueing for TA activation   Patient ed       LBW GTB with squat  1 lap      Single leg bridge on SB  1 10 ea R/L     Wall squats with swiss ball  2  10                             Mild pain            Manual Intervention (15259)      IASTM to Right T/L paraspinals 6'     Lumbar PA's 6'                         NMR re-education (50225)   CUES NEEDED   Needs cueing   Needs cueing   Needs cueing      HL SLR flexion TA  1 10 ea R/L                 Pallof press 1 15x R/L Blue TB   Feet staggered   Quadruped alternating arms  Qudaruped alternating legs 3\" 10x ea B  10x ea B Added to HEP 7/7/22   Standing TB ext with GTB + march 1 10x ea R/L Better stabilization 7/7   Therapeutic Activity (09391)                                          HEP instruction:   Access Code: [email or web address]  URL: [email or web address]  Date: 05/24/2022  Prepared by: Melchor Tay    Therapeutic Exercise and NMR EXR  [x] (79995) Provided verbal/tactile cueing for activities related to strengthening, flexibility, endurance, ROM  for improvements in proximal hip and core control with self care, mobility, lifting and ambulation. [x] (64430) Provided verbal/tactile cueing for activities related to improving balance, coordination, kinesthetic sense, posture, motor skill, proprioception  to assist with core control in self care, mobility, lifting, and ambulation.      Therapeutic Activities:    [] (48569 or 50760) Provided verbal/tactile cueing for activities related to improving balance, coordination, kinesthetic sense, posture, motor skill, proprioception and motor activation to allow for proper function  with self care and ADLs  [] (78161) Provided training and instruction to the patient for proper core and proximal hip recruitment and positioning with ambulation re-education     Home Exercise Program:    [x] (29598) Reviewed/Progressed HEP activities related to strengthening, flexibility, endurance, ROM of core, proximal hip and LE for functional self-care, mobility, lifting and ambulation   [] (39019) Reviewed/Progressed HEP activities related to improving balance, coordination, kinesthetic sense, posture, motor skill, proprioception of core, proximal hip and LE for self care, mobility, lifting, and ambulation      Manual Treatments:  PROM / STM / Oscillations-Mobs:  G-I, II, III, IV (PA's, Inf., Post.)  [x] (10013) Provided manual therapy to mobilize proximal hip and LS spine soft tissue/joints for the purpose of modulating pain, promoting relaxation,  increasing ROM, reducing/eliminating soft tissue swelling/inflammation/restriction, improving soft tissue extensibility and allowing for proper ROM for normal function with self care, mobility, lifting and ambulation. Modalities:   Declined modality this date    Charges  Timed Code Treatment Minutes: 39'   Total Treatment Minutes: 39'     [] EVAL (LOW) 29948   [] EVAL (MOD) 88053   [] EVAL (HIGH) 52610   [] RE-EVAL     [x] NA(28396) x 1    [] IONTO  [x] NMR (96959) x 1    [] VASO  [x] Manual (91126) x  1    [] Other:  [] TA x      [] Mech Traction (79212)  [] ES(attended) (30631)      [] ES (un) (50114):     GOALS: Updated LTG 3 and 5 this date (see below)  Patient stated goal: \"Eliminate pain. \"  [] Progressing: [] Met: [] Not Met: [] Adjusted    Therapist goals for Patient:   Short Term Goals: To be achieved in: 2 weeks  1. Independent in HEP and progression per patient tolerance, in order to prevent re-injury. [] Progressing: [x] Met: [] Not Met: [] Adjusted  2. Patient will have a decrease in pain to facilitate improvement in movement, function, and ADLs as indicated by Functional Deficits. [x] Progressing: [] Met: [] Not Met: [] Adjusted      Long Term Goals: To be achieved in: 6 more weeks from 6/16/22 (  1. Disability index score of 66% or more per FOTO to assist with reaching prior level of function. [] Progressing: [] Met: [] Not Met: [] Adjusted  2.  Patient will demonstrate increased AROM to WNL, good LS mobility, good hip ROM to allow for proper joint functioning as indicated by patients Functional Deficits. [x] Progressing: [] Met: [] Not Met: [] Adjusted  3. Patient will demonstrate an increase in Strength in bilateral hip abduction and ER to 4+/5 and good core activation to allow for proper functional mobility as indicated by patients Functional Deficits. [] Progressing: [] Met: [] Not Met: [] Adjusted  4. Patient will be able to transfer sit <> stand consistently without increased symptoms or restriction. [x] Progressing: [] Met: [] Not Met: [] Adjusted  5. Patient will be able to ambulate for 30 minutes without increased symptoms or restrictions. (patient specific functional goal)    [] Progressing: [] Met: [] Not Met: [] Adjusted     Progression Towards Functional goals:  [x] Patient is progressing as expected towards functional goals listed. [] Progression is slowed due to complexities listed. [] Progression has been slowed due to co-morbidities. [] Plan just implemented, too soon to assess goals progression  [] Other:     Overall Progression Towards Functional goals/ Treatment Progress Update:  [x] Patient is progressing as expected towards functional goals listed. [] Progression is slowed due to complexities/Impairments listed. [] Progression has been slowed due to co-morbidities. [] Plan just implemented, too soon to assess goals progression <30days   [] Goals require adjustment due to lack of progress  [] Patient is not progressing as expected and requires additional follow up with physician  [] Other    Prognosis for POC: [x] Good [] Fair  [] Poor      Patient requires continued skilled intervention: [x] Yes  [] No    Treatment/Activity Tolerance:  [x] Patient able to complete treatment  [] Patient limited by fatigue  [] Patient limited by pain    [] Patient limited by other medical complications  [] Other:     ASSESSMENT: Patient continues to progress with strength and endurance. Demonstrates more core stability and control of TA activation.  Does not demonstrate increase of symptoms during therapeutic exercise. PLAN: See eval  [x] Continue per plan of care [] Alter current plan (see comments above)  [] Plan of care initiated [] Hold pending MD visit [] Discharge      Electronically signed by:  Brigid Arellano PT, DPPÉREZ Kiser SPT Therapist was present, directed the patient's care, made skilled judgement, and was responsible for assessment and treatment of the patient. Note: If patient does not return for scheduled/ recommended follow up visits, this note will serve as a discharge from care along with most recent update on progress.

## 2022-07-19 ENCOUNTER — HOSPITAL ENCOUNTER (OUTPATIENT)
Dept: PHYSICAL THERAPY | Age: 27
Setting detail: THERAPIES SERIES
Discharge: HOME | End: 2022-07-19
Payer: COMMERCIAL

## 2022-07-19 PROCEDURE — 97112 NEUROMUSCULAR REEDUCATION: CPT | Performed by: PHYSICAL THERAPIST

## 2022-07-19 PROCEDURE — 97140 MANUAL THERAPY 1/> REGIONS: CPT | Performed by: PHYSICAL THERAPIST

## 2022-07-19 PROCEDURE — 97110 THERAPEUTIC EXERCISES: CPT | Performed by: PHYSICAL THERAPIST

## 2022-07-19 NOTE — FLOWSHEET NOTE
[facility], [address]  Phone: [phone]  Fax [phone]      Physical Therapy Treatment Note/ Progress Report:       Date:  2022    Patient Name:  Judie Nunez    :  1995  MRN: [id number]  Restrictions/Precautions:    Medical/Treatment Diagnosis Information:  Diagnosis: Strain of neck muscle (S16.1XXD), Cervical radiculitis (M54.12)  Treatment Diagnosis: Neck pain (M54.2), postural dysfunction (Q94.1)  Insurance/Certification information:  PT Insurance Information: Medical [place]  Physician Information:   LUCRECIA Hassan  Has the plan of care been signed (Y/N):        [x]  Yes  []  No     Date of Patient follow up with Physician:     Is this a Progress Report:     []  Yes  [x]  No        If Yes:  Date Range for reporting period:  Beginnin22  Ending    Progress report will be due (10 Rx or 30 days whichever is less):        Recertification will be due (POC Duration  / 90 days whichever is less):         Visit # Insurance Allowable Auth Required   In-person 4 BMN []  Yes [x]  No    Telehealth   []  Yes []  No    Total        Functional Scale: FOTO neck 45/100 (45 %)    Date assessed:  22   Therapy Diagnosis/Practice Pattern: F      Number of Comorbidities:  []0     [x]1-2    []3+     Latex Allergy:  []NO      []YES  Preferred Language for Healthcare:   [x]English       []other:    Pain level:  5/10     SUBJECTIVE:  Patient reports stiffness in the neck and pain that is sharp at times. Reports the pain is usually more of a soreness and is most prevalent when he looks to his left. OBJECTIVE: .    Observation: forward head posture  Test measurements:  PN NV    RESTRICTIONS/PRECAUTIONS:     Exercises/Interventions:   Therapeutic Ex        Sets/sec Reps Notes      sidelying open book stretch 5\" 10x ea R/L       No $ YTB  5\" 1 x 10 With chintuck       Serratus punches on plinth  5\" 1 x 10     Thoracic trunk and UE rotation with head stationary  2 laps  10  Alternating       Quadruped Cat/cow   1 x 5     Thoracic extension over foam roll  20\" 3    PNF D2 flexion with cervical rotation  1 5 ea R/L      Patient ed   Sleeping position, changing positions and postural strength/endurance   Manual Intervention            alower cervical side glides, gentle manual traction,  Thoracic PA's, Cervical PA's, passive cervical rotation  18'                                         NMR re-education          Prone shoulder HAB on SB  3\" 10x    Chin tucks supine with rotation  1  5 ea direction  Some pain                  Attempted but painful    Chin tucks supine with shoulder flexion  10\" 5x    Prone rows on SB #3 3\" 10x Needs cueing   Prone extension on SB #3 3\" 10x  needs cueing   Traction                                 HEP instruction:   Access Code: [email or web address]  URL: [email or web address]  Date: 06/28/2022  Prepared by: Keanu Harding      Therapeutic Exercise and NMR EXR  [x] (61179) Provided verbal/tactile cueing for activities related to strengthening, flexibility, endurance, ROM  for improvements in cervical, postural, scapular, scapulothoracic and UE control with self care, reaching, carrying, lifting, house/yardwork, driving/computer work. [x] (05056) Provided verbal/tactile cueing for activities related to improving balance, coordination, kinesthetic sense, posture, motor skill, proprioception  to assist with cervical, scapular, scapulothoracic and UE control with self care, reaching, carrying, lifting, house/yardwork, driving/computer work.     Therapeutic Activities:    [] (36780 or 92214) Provided verbal/tactile cueing for activities related to improving balance, coordination, kinesthetic sense, posture, motor skill, proprioception and motor activation to allow for proper function of cervical, scapular, scapulothoracic and UE control with self care, carrying, lifting, driving/computer work. Home Exercise Program:    [x] (49890) Reviewed/Progressed HEP activities related to strengthening, flexibility, endurance, ROM of cervical, scapular, scapulothoracic and UE control with self care, reaching, carrying, lifting, house/yardwork, driving/computer work  [] (88880) Reviewed/Progressed HEP activities related to improving balance, coordination, kinesthetic sense, posture, motor skill, proprioception of cervical, scapular, scapulothoracic and UE control with self care, reaching, carrying, lifting, house/yardwork, driving/computer work      Manual Treatments:  PROM / STM / Oscillations-Mobs:  G-I, II, III, IV (PA's, Inf., Post.)  [x] (62501) Provided manual therapy to mobilize soft tissue/joints of cervical/CT, scapular GHJ and UE for the purpose of decreasing headache, modulating pain, promoting relaxation,  increasing ROM, reducing/eliminating soft tissue swelling/inflammation/restriction, improving soft tissue extensibility and allowing for proper ROM for normal function with self care, reaching, carrying, lifting, house/yardwork, driving/computer work    Modalities:  Declined   Charges  Timed Code Treatment Minutes: 52'   Total Treatment Minutes: 52'     [] EVAL (LOW) 09431   [] EVAL (MOD) 07717   [] EVAL (HIGH) 30680   [] RE-EVAL     [x] RV(93175) x 1    [] IONTO  [x] NMR (53741) x   1  [] VASO  [x] Manual (59046) x 1    [] Other:  [] TA x      [] Mech Traction (70289)  [] ES(attended) (51693)      [] ES (un) (20912):     GOALS:  Patient stated goal: \"to decrease pain and get back to more activities\"  [] Progressing: [] Met: [] Not Met: [] Adjusted    Therapist goals for Patient:   Short Term Goals: To be achieved in: 2 weeks  1. Independent in HEP and progression per patient tolerance, in order to prevent re-injury. [x] Progressing: [] Met: [] Not Met: [] Adjusted  2.  Patient will have a decrease in pain to facilitate improvement in movement, function, and ADLs as indicated by Functional Deficits. [x] Progressing: [] Met: [] Not Met: [] Adjusted    Long Term Goals: To be achieved in: 8 weeks  1. Disability index score of 61% or more for the FOTO Neck to assist with reaching prior level of function. [] Progressing: [] Met: [] Not Met: [] Adjusted  2. Patient will demonstrate increased AROM to of cervical rotation to 50 deg B and thoracic mobility to [facility] to allow for proper joint functioning as indicated by patients Functional Deficits. [] Progressing: [] Met: [] Not Met: [] Adjusted  3. Patient will demonstrate an increase in postural awareness and control and activation of  Deep cervical stabilizers to allow for proper functional mobility as indicated by patients Functional Deficits. [] Progressing: [] Met: [] Not Met: [] Adjusted   4. Patient will be able to sleep without symptoms or restriction. [] Progressing: [] Met: [] Not Met: [] Adjusted  5. Patient will be able to drive without increased symptoms or restrictions. (patient specific functional goal) [] Progressing: [] Met: [] Not Met: [] Adjusted     Overall Progression Towards Functional goals/ Treatment Progress Update:  [] Patient is progressing as expected towards functional goals listed. [] Progression is slowed due to complexities/Impairments listed. [] Progression has been slowed due to co-morbidities. [x] Plan just implemented, too soon to assess goals progression <30days   [] Goals require adjustment due to lack of progress  [] Patient is not progressing as expected and requires additional follow up with physician  [] Other    Prognosis for POC: [x] Good [] Fair  [] Poor      Patient requires continued skilled intervention: [x] Yes  [] No      ASSESSMENT: Patient tolerating exercises and manual interventions with minimal pain in left cervical rotation. Achieves deep flexor control with chin tucks and good scapular control.  Patient would benefit from continued mobility throughout the cervicothoracic spine to reduce stiffness and pain and increased UE strength to support the spine posturally. Treatment/Activity Tolerance:  [x] Patient tolerated treatment well [] Patient limited by fatique  [] Patient limited by pain  [] Patient limited by other medical complications  [] Other:     Prognosis: [] Good [x] Fair  [] Poor    Patient Requires Follow-up: [x] Yes  [] No    PLAN: See eval  [x] Continue per plan of care [] Alter current plan (see comments above)  [] Plan of care initiated [] Hold pending MD visit [] Discharge      Electronically signed by:  Edilia Diaz, PT, DPT Breana 44 SPT Therapist was present, directed the patient's care, made skilled judgement, and was responsible for assessment and treatment of the patient. Note: If patient does not return for scheduled/ recommended follow up visits, this note will serve as a discharge from care along with most recent update on progress.

## 2022-07-21 ENCOUNTER — HOSPITAL ENCOUNTER (OUTPATIENT)
Dept: PHYSICAL THERAPY | Age: 27
Setting detail: THERAPIES SERIES
Discharge: HOME | End: 2022-07-21
Payer: COMMERCIAL

## 2022-07-21 PROCEDURE — 97112 NEUROMUSCULAR REEDUCATION: CPT | Performed by: PHYSICAL THERAPIST

## 2022-07-21 PROCEDURE — 97110 THERAPEUTIC EXERCISES: CPT | Performed by: PHYSICAL THERAPIST

## 2022-07-21 PROCEDURE — 97140 MANUAL THERAPY 1/> REGIONS: CPT | Performed by: PHYSICAL THERAPIST

## 2022-07-21 NOTE — PLAN OF CARE
[facility], [address]  Phone: [phone]  Fax [phone]  Physical Therapy Re-Certification Plan of Care/MD UPDATE      Dear Ernst Stein ,    We had the pleasure of treating the following patient for physical therapy services at [address] [place] [address]. A summary of our findings can be found in the updated assessment below. This includes our plan of care. If you have any questions or concerns regarding these findings, please do not hesitate to contact me at the office phone number checked above.   Thank you for the referral.     Physician Signature:________________________________Date:__________________  By signing above (or electronic signature), therapists plan is approved by physician    Date Range Of Visits: 22 - 22  Total Visits to Date: 9  Overall Response to Treatment:   [x]Patient is responding well to treatment and improvement is noted with regards  to goals   []Patient should continue to improve in reasonable time if they continue HEP   []Patient has plateaued and is no longer responding to skilled PT intervention    []Patient is getting worse and would benefit from return to referring MD   []Patient unable to adhere to initial POC   []Other:       Physical Therapy Treatment Note/ Progress Report:           Date:  2022    Patient Name:  Marc Kaminski    :  1995  MRN: [id number]  Restrictions/Precautions:    Medical/Treatment Diagnosis Information:  Diagnosis: Lumbar strain (S39.012D)  Treatment Diagnosis: Low back pain (S18.56)  Insurance/Certification information:  PT Insurance Information: Medical [place]  Physician Information:   Ernst Stein  Has the plan of care been signed (Y/N):        [x]  Yes  []  No     Date of Patient follow up with Physician: not scheduled      Is this a Progress Report:     [x]  Yes (see above)  []  No        If Yes:  Date Range for reporting period:  Beginning:  Ending:     Progress report will be due (10 Rx or 30 days whichever is less):  3/45/77      Recertification will be due (POC Duration  / 90 days whichever is less):         Visit # Insurance Allowable Auth Required   In-person 9 BMN []  Yes []  No    Telehealth   []  Yes []  No    Total            Functional Scale: FOTO lumbar 63/100 (63%)    Date assessed:  7/7/22      Therapy Diagnosis/Practice Pattern: F      Number of Comorbidities:  [x]0     []1-2    []3+    Latex Allergy:  [x]NO      []YES  Preferred Language for Healthcare:   [x]English       []other:      Pain level:  3-4/10    SUBJECTIVE:  Patient reports that he was a little sore after last week's session but his back has felt better in the last few days compared to the last several months.    OBJECTIVE:   Test Measurements: Lumbar AROM WNL with mild discomfort, ext 50% B SB WNL; MMT: hip abduction 4/5 B, hip ER 4/5R, 4+/5 L  RESTRICTIONS/PRECAUTIONS:     Exercises/Interventions:     Therapeutic Ex (16229) Sets/sec Reps Notes/CUES   Hand/heel rock 5\" 5x Break at 5      Cat/cow 5\" 5x ea              Cueing for TA activation   Patient ed   Turner in HEP               Wall squats with swiss ball  2  10           LTR  1  10 ea B/L           Kneeling chops with red medicine ball  2  10 ea R/L     Mild pain      Quantum HABD 60# 2  10 ea R/L     Manual Intervention (34042)      IASTM to Right T/L paraspinals 6'     Lumbar PA's 6'                         NMR re-education (36334)   CUES NEEDED   Needs cueing   Needs cueing   Needs cueing      HL SLR flexion TA  1 10 ea R/L     SL bridge on SB 1 10    Table top swiss ball stabilization alternating  1 10 ea R/L  Alternating    CC lateral walkouts with resisted rotation  5 laps  Ea direction          Supine table bridge on SB 5\" 10x    Feet staggered   Therapeutic Activity (07701)                                          HEP instruction:   Access Code: [email or web address]  URL: [email or web address] [email or web address]  Date: 05/24/2022  Prepared by: Aramis Forde    Therapeutic Exercise and NMR EXR  [x] (77014) Provided verbal/tactile cueing for activities related to strengthening, flexibility, endurance, ROM  for improvements in proximal hip and core control with self care, mobility, lifting and ambulation. [x] (20671) Provided verbal/tactile cueing for activities related to improving balance, coordination, kinesthetic sense, posture, motor skill, proprioception  to assist with core control in self care, mobility, lifting, and ambulation. Therapeutic Activities:    [] (17485 or 83738) Provided verbal/tactile cueing for activities related to improving balance, coordination, kinesthetic sense, posture, motor skill, proprioception and motor activation to allow for proper function  with self care and ADLs  [] (86766) Provided training and instruction to the patient for proper core and proximal hip recruitment and positioning with ambulation re-education     Home Exercise Program:    [x] (93995) Reviewed/Progressed HEP activities related to strengthening, flexibility, endurance, ROM of core, proximal hip and LE for functional self-care, mobility, lifting and ambulation   [] (26308) Reviewed/Progressed HEP activities related to improving balance, coordination, kinesthetic sense, posture, motor skill, proprioception of core, proximal hip and LE for self care, mobility, lifting, and ambulation      Manual Treatments:  PROM / STM / Oscillations-Mobs:  G-I, II, III, IV (PA's, Inf., Post.)  [x] (67299) Provided manual therapy to mobilize proximal hip and LS spine soft tissue/joints for the purpose of modulating pain, promoting relaxation,  increasing ROM, reducing/eliminating soft tissue swelling/inflammation/restriction, improving soft tissue extensibility and allowing for proper ROM for normal function with self care, mobility, lifting and ambulation.      Modalities:   Declined modality this date    Charges  Timed Code Treatment Minutes: 39'   Total Treatment Minutes: 39'     [] EVAL (LOW) 17537   [] EVAL (MOD) 18866   [] EVAL (HIGH) 83908   [] RE-EVAL     [x] BF(20403) x 1    [] IONTO  [x] NMR (50759) x 1    [] VASO  [x] Manual (75276) x  1    [] Other:  [] TA x      [] Mech Traction (47276)  [] ES(attended) (29792)      [] ES (un) (67278):     GOALS: Updated LTG 3 and 5 this date (see below)  Patient stated goal: \"Eliminate pain. \"  [] Progressing: [] Met: [] Not Met: [] Adjusted    Therapist goals for Patient:   Short Term Goals: To be achieved in: 2 weeks  1. Independent in HEP and progression per patient tolerance, in order to prevent re-injury. [] Progressing: [x] Met: [] Not Met: [] Adjusted  2. Patient will have a decrease in pain to facilitate improvement in movement, function, and ADLs as indicated by Functional Deficits. [x] Progressing: [] Met: [] Not Met: [] Adjusted      Long Term Goals: To be achieved in: 6 more weeks from 7/21/22 (9/1/22)  1. Disability index score of 66% or more per FOTO to assist with reaching prior level of function. [x] Progressing: [] Met: [] Not Met: [] Adjusted  2. Patient will demonstrate increased AROM to WNL, good LS mobility, good hip ROM to allow for proper joint functioning as indicated by patients Functional Deficits. [x] Progressing: [] Met: [] Not Met: [] Adjusted  3. Patient will demonstrate an increase in Strength in bilateral hip abduction and ER to 4+/5 and good core activation to allow for proper functional mobility as indicated by patients Functional Deficits. [x] Progressing: [] Met: [] Not Met: [] Adjusted  4. Patient will be able to transfer sit <> stand consistently without increased symptoms or restriction. [] Progressing: [x] Met: [] Not Met: [] Adjusted  5.  Patient will be able to ambulate for 30 minutes without increased symptoms or restrictions. (patient specific functional goal)    [] Progressing: [x] Met: [] Not Met: [] update on progress.

## 2022-07-26 ENCOUNTER — HOSPITAL ENCOUNTER (OUTPATIENT)
Dept: PHYSICAL THERAPY | Age: 27
Setting detail: THERAPIES SERIES
Discharge: HOME | End: 2022-07-26
Payer: COMMERCIAL

## 2022-07-26 DIAGNOSIS — S16.1XXD STRAIN OF NECK MUSCLE, SUBSEQUENT ENCOUNTER: Primary | ICD-10-CM

## 2022-07-26 DIAGNOSIS — S39.012A LUMBAR STRAIN, INITIAL ENCOUNTER: ICD-10-CM

## 2022-07-26 PROCEDURE — 97110 THERAPEUTIC EXERCISES: CPT | Performed by: PHYSICAL THERAPIST

## 2022-07-26 PROCEDURE — 97140 MANUAL THERAPY 1/> REGIONS: CPT | Performed by: PHYSICAL THERAPIST

## 2022-07-26 NOTE — PROGRESS NOTES
Dr. Barrios was unsure if patient knows where to go for his chest xray that was ordered.   Attempted to call patient to let him know. Phone was unanswered and did not connect to a voicemail.   Will need to try again.    [facility], [address]  Phone: [phone]  Fax [phone]      Physical Therapy Treatment Note/ Progress Report:       Date:  2022    Patient Name:  Jessica You    :  1995  MRN: [id number]  Restrictions/Precautions:    Medical/Treatment Diagnosis Information:  Diagnosis: Strain of neck muscle (S16.1XXD), Cervical radiculitis (M54.12)  Treatment Diagnosis: Neck pain (M54.2), postural dysfunction (D38.8)  Insurance/Certification information:  PT Insurance Information: Medical [place]  Physician Information:   LUCRECIA Ricardo  Has the plan of care been signed (Y/N):        [x]  Yes  []  No     Date of Patient follow up with Physician:     Is this a Progress Report:     [x]  Yes  []  No        If Yes:  Date Range for reporting period:  Beginnin22  Endin22    Progress report will be due (10 Rx or 30 days whichever is less):        Recertification will be due (POC Duration  / 90 days whichever is less):         Visit # Insurance Allowable Auth Required   In-person 5 BMN []  Yes [x]  No    Telehealth   []  Yes []  No    Total        Functional Scale: FOTO neck 51/100 (51 %)    Date assessed:  22   Therapy Diagnosis/Practice Pattern: F      Number of Comorbidities:  []0     [x]1-2    []3+     Latex Allergy:  []NO      []YES  Preferred Language for Healthcare:   [x]English       []other:    Pain level:  5/10     SUBJECTIVE:  Patient reports that he stopped taking Gabapentin on Wednesday of last week and has began to experience neck pain and nerve tingling into his right arm and entire hand. Patient states that looking to his left is painful right in the neck. OBJECTIVE: .    Observation: forward head posture   Test measurements:  cervical rotation: L 20 degrees with pain, R 55 without pain, 60 with pain; hypomobile in T spine    RESTRICTIONS/PRECAUTIONS: Exercises/Interventions:   Therapeutic Ex        Sets/sec Reps Notes      sidelying open book stretch 5\" 10x ea R/L       No $ YTB  5\" 2 x 10 With chintuck          Quadruped UE threading   1 x 10 ea R/L      Thoracic extension over foam roll  20\" 4       Patient ed 10'  Sleeping position, changing positions, and postural strength/endurance, to help reduce tightness and nerve tension, TDN     Manual Intervention            alower cervical side glides, gentle manual traction,  Thoracic PA's, Cervical PA's, passive cervical rotation  18'  Manual traction improved pain and eliminated N/T in the right UE                                        NMR re-education             Chin tucks supine with rotation  1  10 ea direction  Pain looking to left                  Attempted but painful    Chin tucks supine with shoulder flexion  10\" 5x    Traction                                 HEP instruction:   Access Code: [email or web address]  URL: [email or web address]  Date: 06/28/2022  Prepared by: Jaswinder Han      Therapeutic Exercise and NMR EXR  [x] (03119) Provided verbal/tactile cueing for activities related to strengthening, flexibility, endurance, ROM  for improvements in cervical, postural, scapular, scapulothoracic and UE control with self care, reaching, carrying, lifting, house/yardwork, driving/computer work. [x] (30709) Provided verbal/tactile cueing for activities related to improving balance, coordination, kinesthetic sense, posture, motor skill, proprioception  to assist with cervical, scapular, scapulothoracic and UE control with self care, reaching, carrying, lifting, house/yardwork, driving/computer work.     Therapeutic Activities:    [] (86809 or 00868) Provided verbal/tactile cueing for activities related to improving balance, coordination, kinesthetic sense, posture, motor skill, proprioception and motor activation to allow for proper function of cervical, scapular, scapulothoracic and UE control with self care, carrying, lifting, driving/computer work. Home Exercise Program:    [x] (45205) Reviewed/Progressed HEP activities related to strengthening, flexibility, endurance, ROM of cervical, scapular, scapulothoracic and UE control with self care, reaching, carrying, lifting, house/yardwork, driving/computer work  [] (44206) Reviewed/Progressed HEP activities related to improving balance, coordination, kinesthetic sense, posture, motor skill, proprioception of cervical, scapular, scapulothoracic and UE control with self care, reaching, carrying, lifting, house/yardwork, driving/computer work      Manual Treatments:  PROM / STM / Oscillations-Mobs:  G-I, II, III, IV (PA's, Inf., Post.)  [x] (49253) Provided manual therapy to mobilize soft tissue/joints of cervical/CT, scapular GHJ and UE for the purpose of decreasing headache, modulating pain, promoting relaxation,  increasing ROM, reducing/eliminating soft tissue swelling/inflammation/restriction, improving soft tissue extensibility and allowing for proper ROM for normal function with self care, reaching, carrying, lifting, house/yardwork, driving/computer work    Modalities:  Declined   Charges  Timed Code Treatment Minutes: 39'   Total Treatment Minutes: 45'     [] EVAL (LOW) 66834   [] EVAL (MOD) 42241   [] EVAL (HIGH) 03097   [] RE-EVAL     [x] JK(28043) x 2    [] IONTO  [] NMR (77859) x   [] VASO  [x] Manual (09953) x 1    [] Other:  [] TA x      [] Mech Traction (37104)  [] ES(attended) (46805)      [] ES (un) (88439):     GOALS:  Patient stated goal: \"to decrease pain and get back to more activities\"  [] Progressing: [] Met: [] Not Met: [] Adjusted    Therapist goals for Patient:   Short Term Goals: To be achieved in: 2 weeks  1. Independent in HEP and progression per patient tolerance, in order to prevent re-injury. [x] Progressing: [] Met: [] Not Met: [] Adjusted  2.  Patient will have a decrease in pain to facilitate improvement in movement, function, and ADLs as indicated by Functional Deficits. [x] Progressing: [] Met: [] Not Met: [] Adjusted    Long Term Goals: To be achieved in: 8 weeks  1. Disability index score of 61% or more for the FOTO Neck to assist with reaching prior level of function. [] Progressing: [] Met: [] Not Met: [] Adjusted  2. Patient will demonstrate increased AROM to of cervical rotation to 50 deg B and thoracic mobility to [facility] to allow for proper joint functioning as indicated by patients Functional Deficits. [x] Progressing: [] Met: [] Not Met: [] Adjusted  3. Patient will demonstrate an increase in postural awareness and control and activation of  Deep cervical stabilizers to allow for proper functional mobility as indicated by patients Functional Deficits. [] Progressing: [] Met: [] Not Met: [] Adjusted   4. Patient will be able to sleep without symptoms or restriction. [x] Progressing: [] Met: [] Not Met: [] Adjusted  5. Patient will be able to drive without increased symptoms or restrictions. (patient specific functional goal) [x] Progressing: [] Met: [] Not Met: [] Adjusted     Overall Progression Towards Functional goals/ Treatment Progress Update:  [] Patient is progressing as expected towards functional goals listed. [] Progression is slowed due to complexities/Impairments listed. [] Progression has been slowed due to co-morbidities. [x] Plan just implemented, too soon to assess goals progression <30days   [] Goals require adjustment due to lack of progress  [] Patient is not progressing as expected and requires additional follow up with physician  [] Other    Prognosis for POC: [x] Good [] Fair  [] Poor      Patient requires continued skilled intervention: [x] Yes  [] No      ASSESSMENT:  Patient entered clinic today with regression of symptoms and N/T into the arm and hand that started 2 days after stopping Gabapentin.  Motions involving  cervical rotation are the most bothersome especially when looking to the left >20 degrees. Numbness and tingling in the right arm was eliminated by cervical traction and it returned after stopping traction. Patient did not tolerate a lot of exercises today and spent time discussing postural adjustments at home as well as avoiding painful positioning. Discussed trialing TDN if approved by LUCRECIA CHAPPELL. Patient would benefit from continued skilled PT to address postural strength and reduce tightness in C/T spine. Treatment/Activity Tolerance:  [x] Patient tolerated treatment well [] Patient limited by fatique  [] Patient limited by pain  [] Patient limited by other medical complications  [] Other:     Prognosis: [] Good [x] Fair  [] Poor    Patient Requires Follow-up: [x] Yes  [] No    PLAN: See eval  [x] Continue per plan of care [] Alter current plan (see comments above)  [] Plan of care initiated [] Hold pending MD visit [] Discharge      Electronically signed by:  Liane Malcolm, PT, DPT Breana Kiser SPT Therapist was present, directed the patient's care, made skilled judgement, and was responsible for assessment and treatment of the patient. Note: If patient does not return for scheduled/ recommended follow up visits, this note will serve as a discharge from care along with most recent update on progress.

## 2022-07-28 ENCOUNTER — HOSPITAL ENCOUNTER (OUTPATIENT)
Dept: PHYSICAL THERAPY | Age: 27
Setting detail: THERAPIES SERIES
Discharge: HOME | End: 2022-07-28
Payer: COMMERCIAL

## 2022-07-28 PROCEDURE — 97112 NEUROMUSCULAR REEDUCATION: CPT | Performed by: PHYSICAL THERAPIST

## 2022-07-28 PROCEDURE — 97110 THERAPEUTIC EXERCISES: CPT | Performed by: PHYSICAL THERAPIST

## 2022-07-28 PROCEDURE — 20560 NDL INSJ W/O NJX 1 OR 2 MUSC: CPT | Performed by: PHYSICAL THERAPIST

## 2022-07-28 PROCEDURE — 97140 MANUAL THERAPY 1/> REGIONS: CPT | Performed by: PHYSICAL THERAPIST

## 2022-07-28 NOTE — PLAN OF CARE
[facility], [address]  Phone: [phone]  Fax [phone]    Physical Therapy Treatment Note/ Progress Report:           Date:  2022    Patient Name:  Elysia Pizarro    :  1995  MRN: [id number]  Restrictions/Precautions:    Medical/Treatment Diagnosis Information:  Diagnosis: Lumbar strain (S39.012D)  Treatment Diagnosis: Low back pain (Z10.52)  Insurance/Certification information:  PT Insurance Information: [employer]  Physician Information:   González Stokes  Has the plan of care been signed (Y/N):        [x]  Yes  []  No     Date of Patient follow up with Physician: not scheduled      Is this a Progress Report:     []  Yes (see above)  [x]  No        If Yes:  Date Range for reporting period:  Beginning:  Ending:     Progress report will be due (10 Rx or 30 days whichever is less):  49      Recertification will be due (POC Duration  / 90 days whichever is less):         Visit # Insurance Allowable Auth Required   In-person 10 BMN []  Yes []  No    Telehealth   []  Yes []  No    Total            Functional Scale: FOTO lumbar 63/100 (63%)    Date assessed:  22      Therapy Diagnosis/Practice Pattern: F      Number of Comorbidities:  [x]0     []1-2    []3+    Latex Allergy:  [x]NO      []YES  Preferred Language for Healthcare:   [x]English       []other:      Pain level:  3/10    SUBJECTIVE:  Patient reports that overall he is doing a little better since Tuesday. Reports an aching in his lower back and into his gluteal but not any lower in his leg. Reports that the numbness in his arms are better overall, but still having some in the elbow. OBJECTIVE:   Test Measurements: NT this date  RESTRICTIONS/PRECAUTIONS:     Exercises/Interventions:   Consent signed 2022 and precautions addressed. See media tab.    Muscle  Needle Size Technique Notes IES   Site 1 L4 Multifidus x 2 on R, x 1 on L .3x75mm [] Pistoning / []  Threading  [x]  Winding/Coning LTR []    Site 2 L5 multifidus x 1 ea B .3x75mm [] Pistoning / []  Threading  [x]  Winding/Coning LTR []    Site 3   [] Pistoning / []  Threading  []  Winding/Coning  []    Site 4   [] Pistoning / []  Threading  []  Winding/Coning  []    Site 5   [] Pistoning / []  Threading  []  Winding/Coning  []    Site 6   [] Pistoning / []  Threading  []  Winding/Coning  []    Site 7   [] Pistoning / []  Threading  []  Winding/Coning  []    Site 8   [] Pistoning / []  Threading  []  Winding/Coning  []    Site 9   [] Pistoning / []  Threading  []  Winding/Coning  []    Site 10   [] Pistoning / []  Threading  []  Winding/Coning  []      **The above techniques were used to restore functional range of motion, reduce muscle spasm, and induce healing in the corresponding musculature by means of intramuscular mobilization. Clean Technique was utilized today while applying the Dry needling treatment. The treatment sites where cleaned with 70% solution of isopropyl alcohol. The PT washed their hands and utilized treatment gloves along with hand  prior to inserting the needles. All needles where removed and discarded in the appropriate sharps container. MD has given verbal and/or written approval for this treatment. **          ** Educated patient on anatomy, trigger point etiology, expectations for TDN (bruising, soreness, etc), outcomes, and recommendations for exercise.  **    Therapeutic Ex (11954) Sets/sec Reps Notes/CUES   Hand/heel rock 5\" 5x Break at 5      Cat/cow 5\" 5x ea              Cueing for TA activation   Patient ed   [place] in HEP         Standard plank  15\" 2        Side plank with clamshell OVL  2  10 ea R/L     LTR  1  10 ea B/L     Side plank with HABD  2  10 ea R/L        Mild pain         Manual Intervention (41030)      Palpation and assessment of Taut bands and TPs for TDN 5'               Lumbar roll stretch 3'     Lumbar PA's 6' NMR re-education (74237)   CUES NEEDED   Needs cueing   Needs cueing   Needs cueing      HL SLR flexion TA #4 2 10 ea R/L     SL bridge on SB 1 10    Table top swiss ball stabilization alternating  1 10 ea R/L  Alternating    CC lateral walkouts with resisted rotation 5 laps Ea R/L    Supine table bridge on SB 5\" 10x    Feet staggered   Therapeutic Activity (37377)                                          HEP instruction:   Access Code: [email or web address]  URL: [email or web address]  Date: 05/24/2022  Prepared by: Van Costello    Therapeutic Exercise and NMR EXR  [x] (32943) Provided verbal/tactile cueing for activities related to strengthening, flexibility, endurance, ROM  for improvements in proximal hip and core control with self care, mobility, lifting and ambulation. [x] (67806) Provided verbal/tactile cueing for activities related to improving balance, coordination, kinesthetic sense, posture, motor skill, proprioception  to assist with core control in self care, mobility, lifting, and ambulation.      Therapeutic Activities:    [] (50710 or 77297) Provided verbal/tactile cueing for activities related to improving balance, coordination, kinesthetic sense, posture, motor skill, proprioception and motor activation to allow for proper function  with self care and ADLs  [] (41989) Provided training and instruction to the patient for proper core and proximal hip recruitment and positioning with ambulation re-education     Home Exercise Program:    [x] (75448) Reviewed/Progressed HEP activities related to strengthening, flexibility, endurance, ROM of core, proximal hip and LE for functional self-care, mobility, lifting and ambulation   [] (26435) Reviewed/Progressed HEP activities related to improving balance, coordination, kinesthetic sense, posture, motor skill, proprioception of core, proximal hip and LE for self care, mobility, lifting, and ambulation      Manual Treatments:  PROM / STM / Oscillations-Mobs:  G-I, II, III, IV (PA's, Inf., Post.)  [x] (02746) Provided manual therapy to mobilize proximal hip and LS spine soft tissue/joints for the purpose of modulating pain, promoting relaxation,  increasing ROM, reducing/eliminating soft tissue swelling/inflammation/restriction, improving soft tissue extensibility and allowing for proper ROM for normal function with self care, mobility, lifting and ambulation. Modalities:   Declined modality this date    Trigger point Dry Needling:  fine needle insertion into myofascial trigger points to stimulate a healing response  [x] (46469) Needle insertion without injection: 1 or 2 muscles  [] (93620) Needle insertion without injection: 3 or more muscles    Charges  Timed Code Treatment Minutes: 45'   Total Treatment Minutes: 39'     [] EVAL (LOW) 00076 (typically 20 minutes face-to-face)   [] EVAL (MOD) 31696 (typically 30 minutes face-to-face)  [] EVAL (HIGH) 30472 (typically 45 minutes face-to-face)  [] RE-EVAL     [x] EJ(84146) x   1  [] IONTO  [x] NMR (80308) x  1  [] VASO  [x] Manual (68290) x 1   [x] TDN needle insertion  [] TA x      [] Mech Traction (98901)  [] ES(attended) (17399)    [] ES (un) (45596):   [] EVAL (LOW) 73240   [] EVAL (MOD) 28557   [] EVAL (HIGH) 09351   [] RE-EVAL     GOALS:   Patient stated goal: \"Eliminate pain. \"  [] Progressing: [] Met: [] Not Met: [] Adjusted    Therapist goals for Patient:   Short Term Goals: To be achieved in: 2 weeks  1. Independent in HEP and progression per patient tolerance, in order to prevent re-injury. [] Progressing: [x] Met: [] Not Met: [] Adjusted  2. Patient will have a decrease in pain to facilitate improvement in movement, function, and ADLs as indicated by Functional Deficits. [x] Progressing: [] Met: [] Not Met: [] Adjusted      Long Term Goals: To be achieved in: 6 more weeks from 7/21/22 (9/1/22)  1.  Disability index score of 66% or more per FOTO to assist with reaching prior level of function. [x] Progressing: [] Met: [] Not Met: [] Adjusted  2. Patient will demonstrate increased AROM to WNL, good LS mobility, good hip ROM to allow for proper joint functioning as indicated by patients Functional Deficits. [x] Progressing: [] Met: [] Not Met: [] Adjusted  3. Patient will demonstrate an increase in Strength in bilateral hip abduction and ER to 4+/5 and good core activation to allow for proper functional mobility as indicated by patients Functional Deficits. [x] Progressing: [] Met: [] Not Met: [] Adjusted  4. Patient will be able to transfer sit <> stand consistently without increased symptoms or restriction. [] Progressing: [x] Met: [] Not Met: [] Adjusted  5. Patient will be able to ambulate for 30 minutes without increased symptoms or restrictions. (patient specific functional goal)    [] Progressing: [x] Met: [] Not Met: [] Adjusted     Progression Towards Functional goals:  [x] Patient is progressing as expected towards functional goals listed. [] Progression is slowed due to complexities listed. [] Progression has been slowed due to co-morbidities. [] Plan just implemented, too soon to assess goals progression  [] Other:     Overall Progression Towards Functional goals/ Treatment Progress Update:  [x] Patient is progressing as expected towards functional goals listed. [] Progression is slowed due to complexities/Impairments listed. [] Progression has been slowed due to co-morbidities.   [] Plan just implemented, too soon to assess goals progression <30days   [] Goals require adjustment due to lack of progress  [] Patient is not progressing as expected and requires additional follow up with physician  [] Other    Prognosis for POC: [x] Good [] Fair  [] Poor      Patient requires continued skilled intervention: [x] Yes  [] No    Treatment/Activity Tolerance:  [x] Patient able to complete treatment  [] Patient limited by fatigue  [] Patient limited by pain    [] Patient limited by other medical complications  [] Other:     ASSESSMENT: Patient with improved symptoms in both neck and back as compared to 2 days ago at neck tx session. Patient tolerated needling followed by TE with no increase in symptoms or pain. Patient completed standard plank this date with some fatigue but no pain. Continued to educate patient on posture and core/DNF strength. PLAN: See eval  [x] Continue per plan of care [] Alter current plan (see comments above)  [] Plan of care initiated [] Hold pending MD visit [] Discharge      Electronically signed by:  Liane Malcolm, PT, DPT Breana 43 SPT Therapist was present, directed the patient's care, made skilled judgement, and was responsible for assessment and treatment of the patient. Note: If patient does not return for scheduled/ recommended follow up visits, this note will serve as a discharge from care along with most recent update on progress.

## 2022-08-02 ENCOUNTER — HOSPITAL ENCOUNTER (OUTPATIENT)
Dept: PHYSICAL THERAPY | Age: 27
Setting detail: THERAPIES SERIES
Discharge: HOME | End: 2022-08-02

## 2022-08-02 PROCEDURE — 97110 THERAPEUTIC EXERCISES: CPT | Performed by: PHYSICAL THERAPIST

## 2022-08-02 PROCEDURE — 20560 NDL INSJ W/O NJX 1 OR 2 MUSC: CPT | Performed by: PHYSICAL THERAPIST

## 2022-08-02 PROCEDURE — 97140 MANUAL THERAPY 1/> REGIONS: CPT | Performed by: PHYSICAL THERAPIST

## 2022-08-02 NOTE — FLOWSHEET NOTE
[facility], [address] [place] [address]  Phone: [phone]  Fax [phone]      Physical Therapy Treatment Note/ Progress Report:       Date:  2022    Patient Name:  Marc Kaminski    :  1995  MRN: [id number]  Restrictions/Precautions:    Medical/Treatment Diagnosis Information:  Diagnosis: Strain of neck muscle (S16.1XXD), Cervical radiculitis (M54.12)  Treatment Diagnosis: Neck pain (M54.2), postural dysfunction (W03.8)  Insurance/Certification information:  PT Insurance Information: [employer]  Physician Information:   LUCRECIA Arechiga  Has the plan of care been signed (Y/N):        [x]  Yes  []  No     Date of Patient follow up with Physician:     Is this a Progress Report:     []  Yes  [x]  No        If Yes:  Date Range for reporting period:  Beginnin22  Ending:     Progress report will be due (10 Rx or 30 days whichever is less): 02       Recertification will be due (POC Duration  / 90 days whichever is less):         Visit # Insurance Allowable Auth Required   In-person 6 BMN []  Yes [x]  No    Telehealth   []  Yes []  No    Total        Functional Scale: FOTO neck 51/100 (51 %)    Date assessed:  22   Therapy Diagnosis/Practice Pattern: F      Number of Comorbidities:  []0     [x]1-2    []3+     Latex Allergy:  []NO      []YES  Preferred Language for Healthcare:   [x]English       []other:    Pain level:  5/10     SUBJECTIVE:  Patient reports that his neck is doing okay today. Seems to have calmed down from the flare up last week, but back to baseline soreness. OBJECTIVE: . Observation: forward head posture   Test measurements:  Cervical rotation post TDN and manuals 50 deg without pain    RESTRICTIONS/PRECAUTIONS:     Exercises/Interventions:   Consent signed 2022 and precautions addressed. See media tab.    Muscle  Needle Size Technique Notes IES   Site 1 Right upper trap x 3 .3x40mm [] Pistoning / [x]  Threading  []  Winding/Coning Multiple large LTRs []    Site 2 Left upper trap x 3 .3x40mm [] Pistoning / [x]  Threading  []  Winding/Coning Multiple LTRs []    Site 3   [] Pistoning / []  Threading  []  Winding/Coning  []    Site 4   [] Pistoning / []  Threading  []  Winding/Coning  []    Site 5   [] Pistoning / []  Threading  []  Winding/Coning  []    Site 6   [] Pistoning / []  Threading  []  Winding/Coning  []    Site 7   [] Pistoning / []  Threading  []  Winding/Coning  []    Site 8   [] Pistoning / []  Threading  []  Winding/Coning  []    Site 9   [] Pistoning / []  Threading  []  Winding/Coning  []    Site 10   [] Pistoning / []  Threading  []  Winding/Coning  []      **The above techniques were used to restore functional range of motion, reduce muscle spasm, and induce healing in the corresponding musculature by means of intramuscular mobilization. Clean Technique was utilized today while applying the Dry needling treatment. The treatment sites where cleaned with 70% solution of isopropyl alcohol. The PT washed their hands and utilized treatment gloves along with hand  prior to inserting the needles. All needles where removed and discarded in the appropriate sharps container. MD has given verbal and/or written approval for this treatment. **          ** Educated patient on anatomy, trigger point etiology, expectations for TDN (bruising, soreness, etc), outcomes, and recommendations for exercise.  **    Therapeutic Ex        Sets/sec Reps Notes   Standing UT stretch  30\" 3x ea B          Wing armed breahing      With chintuck          RTB ext feet staggered 3\" 10x ea R/L    Rows GTB  2 10  Easy             Patient ed 10'  Sleeping position, changing positions, and postural strength/endurance, to help reduce tightness and nerve tension, TDN     Manual Intervention           Palpation and assessment of TPs and taut bands for TDN 5'     STM bilateral upper traps post TDN, SOR, STM cervical paraspinals 8'     Prone T spine mobilizations Gr III 4'     Supine A to P thrust mid T spine 2'     Cervical side glides 4'     Gentle manual traction 3     NMR re-education             Supine chin tucks 10\" 10x    Pain looking to left                  Attempted but painful    Chin tucks supine with shoulder flexion  1 10x ea alternating   TDN           2'                 HEP instruction:   Access Code: [email or web address]  URL: [email or web address]  Date: 06/28/2022  Prepared by: Melchor Tay      Therapeutic Exercise and NMR EXR  [x] (92548) Provided verbal/tactile cueing for activities related to strengthening, flexibility, endurance, ROM  for improvements in cervical, postural, scapular, scapulothoracic and UE control with self care, reaching, carrying, lifting, house/yardwork, driving/computer work. [x] (02152) Provided verbal/tactile cueing for activities related to improving balance, coordination, kinesthetic sense, posture, motor skill, proprioception  to assist with cervical, scapular, scapulothoracic and UE control with self care, reaching, carrying, lifting, house/yardwork, driving/computer work. Therapeutic Activities:    [] (52049 or 82896) Provided verbal/tactile cueing for activities related to improving balance, coordination, kinesthetic sense, posture, motor skill, proprioception and motor activation to allow for proper function of cervical, scapular, scapulothoracic and UE control with self care, carrying, lifting, driving/computer work.      Home Exercise Program:    [x] (14502) Reviewed/Progressed HEP activities related to strengthening, flexibility, endurance, ROM of cervical, scapular, scapulothoracic and UE control with self care, reaching, carrying, lifting, house/yardwork, driving/computer work  [] (21790) Reviewed/Progressed HEP activities related to improving balance, coordination, kinesthetic sense, posture, motor skill, proprioception of cervical, scapular, scapulothoracic and UE control with self care, reaching, carrying, lifting, house/yardwork, driving/computer work      Manual Treatments:  PROM / STM / Oscillations-Mobs:  G-I, II, III, IV (PA's, Inf., Post.)  [x] (90842) Provided manual therapy to mobilize soft tissue/joints of cervical/CT, scapular GHJ and UE for the purpose of decreasing headache, modulating pain, promoting relaxation,  increasing ROM, reducing/eliminating soft tissue swelling/inflammation/restriction, improving soft tissue extensibility and allowing for proper ROM for normal function with self care, reaching, carrying, lifting, house/yardwork, driving/computer work    Trigger point Dry Needling:  fine needle insertion into myofascial trigger points to stimulate a healing response  [x] (33610) Needle insertion without injection: 1 or 2 muscles  [] (42138) Needle insertion without injection: 3 or more muscles    Modalities:  Declined   Charges  Timed Code Treatment Minutes: 42'   Total Treatment Minutes: 40'     [] EVAL (LOW) 45304   [] EVAL (MOD) 66722   [] EVAL (HIGH) 62579   [] RE-EVAL     [x] FL(25402) x 1    [] IONTO  [] NMR (03668) x   [] VASO  [x] Manual (63994) x 2  [x] TDN needle insertion (1-2)  [] TA x      [] Mech Traction (13126)  [] ES(attended) (74571)      [] ES (un) (43223):     GOALS:  Patient stated goal: \"to decrease pain and get back to more activities\"  [] Progressing: [] Met: [] Not Met: [] Adjusted    Therapist goals for Patient:   Short Term Goals: To be achieved in: 2 weeks  1. Independent in HEP and progression per patient tolerance, in order to prevent re-injury. [x] Progressing: [] Met: [] Not Met: [] Adjusted  2. Patient will have a decrease in pain to facilitate improvement in movement, function, and ADLs as indicated by Functional Deficits. [x] Progressing: [] Met: [] Not Met: [] Adjusted    Long Term Goals: To be achieved in: 8 weeks  1.  Disability index score of 61% or more for the FOTO Neck to assist with reaching prior level of function. [] Progressing: [] Met: [] Not Met: [] Adjusted  2. Patient will demonstrate increased AROM to of cervical rotation to 50 deg B and thoracic mobility to [facility] to allow for proper joint functioning as indicated by patients Functional Deficits. [x] Progressing: [] Met: [] Not Met: [] Adjusted  3. Patient will demonstrate an increase in postural awareness and control and activation of  Deep cervical stabilizers to allow for proper functional mobility as indicated by patients Functional Deficits. [] Progressing: [] Met: [] Not Met: [] Adjusted   4. Patient will be able to sleep without symptoms or restriction. [x] Progressing: [] Met: [] Not Met: [] Adjusted  5. Patient will be able to drive without increased symptoms or restrictions. (patient specific functional goal) [x] Progressing: [] Met: [] Not Met: [] Adjusted     Overall Progression Towards Functional goals/ Treatment Progress Update:  [] Patient is progressing as expected towards functional goals listed. [] Progression is slowed due to complexities/Impairments listed. [] Progression has been slowed due to co-morbidities. [x] Plan just implemented, too soon to assess goals progression <30days   [] Goals require adjustment due to lack of progress  [] Patient is not progressing as expected and requires additional follow up with physician  [] Other    Prognosis for POC: [x] Good [] Fair  [] Poor      Patient requires continued skilled intervention: [x] Yes  [] No      ASSESSMENT:  Patient with multiple LTRs noted in bilateral upper traps. Demonstrated improved AROM in cervical rotation following TDN and manuals. Continued to educate patient on postural strength and stability.      Treatment/Activity Tolerance:  [x] Patient tolerated treatment well [] Patient limited by fatique  [] Patient limited by pain  [] Patient limited by other medical complications  [] Other:     Prognosis: [] Good [x] Fair  [] Poor    Patient Requires Follow-up: [x] Yes  [] No    PLAN: See eval  [x] Continue per plan of care [] Alter current plan (see comments above)  [] Plan of care initiated [] Hold pending MD visit [] Discharge      Electronically signed by:  Jaquan Ba, PT, DPT [id number]       Note: If patient does not return for scheduled/ recommended follow up visits, this note will serve as a discharge from care along with most recent update on progress.

## 2022-08-04 ENCOUNTER — HOSPITAL ENCOUNTER (OUTPATIENT)
Dept: PHYSICAL THERAPY | Age: 27
Setting detail: THERAPIES SERIES
Discharge: HOME | End: 2022-08-04

## 2022-08-04 PROCEDURE — 97112 NEUROMUSCULAR REEDUCATION: CPT | Performed by: PHYSICAL THERAPIST

## 2022-08-04 PROCEDURE — 97110 THERAPEUTIC EXERCISES: CPT | Performed by: PHYSICAL THERAPIST

## 2022-08-04 PROCEDURE — 20560 NDL INSJ W/O NJX 1 OR 2 MUSC: CPT | Performed by: PHYSICAL THERAPIST

## 2022-08-04 PROCEDURE — 97140 MANUAL THERAPY 1/> REGIONS: CPT | Performed by: PHYSICAL THERAPIST

## 2022-08-04 NOTE — FLOWSHEET NOTE
[facility], [address]  Phone: [phone]  Fax [phone]    Physical Therapy Treatment Note/ Progress Report:           Date:  2022    Patient Name:  You Corea    :  1995  MRN: [id number]  Restrictions/Precautions:    Medical/Treatment Diagnosis Information:  Diagnosis: Lumbar strain (S39.012D)  Treatment Diagnosis: Low back pain (K55.74)  Insurance/Certification information:  PT Insurance Information: [employer] [place]  Physician Information:   Lucy Asherse  Has the plan of care been signed (Y/N):        [x]  Yes  []  No     Date of Patient follow up with Physician: not scheduled      Is this a Progress Report:     []  Yes (see above)  [x]  No        If Yes:  Date Range for reporting period:  Beginning:  Ending:     Progress report will be due (10 Rx or 30 days whichever is less):  3/53/78      Recertification will be due (POC Duration  / 90 days whichever is less):         Visit # Insurance Allowable Auth Required   In-person 11 BMN []  Yes []  No    Telehealth   []  Yes []  No    Total            Functional Scale: FOTO lumbar 63/100 (63%)    Date assessed:  22      Therapy Diagnosis/Practice Pattern: F      Number of Comorbidities:  [x]0     []1-2    []3+    Latex Allergy:  [x]NO      []YES  Preferred Language for Healthcare:   [x]English       []other:      Pain level:  1-2/10    SUBJECTIVE:  Patient reports that his back is doing pretty well considering he had to sit for about 10 hours yesterday on the phone. OBJECTIVE:   Test Measurements: NT this date  RESTRICTIONS/PRECAUTIONS:     Exercises/Interventions:   Consent signed 2022 and precautions addressed. See media tab. Muscle  Needle Size Technique Notes IES   Site 1 L4-5 Multifidus x 1 ea B .3x75mm [] Pistoning / []  Threading  [x]  Winding/Coning  []    Site 2 Bilateral glut medius x 1 ea . 3x75mm [x] Pistoning / [] Threading  []  Winding/Coning LTR []    Site 3   [] Pistoning / []  Threading  []  Winding/Coning  []    Site 4   [] Pistoning / []  Threading  []  Winding/Coning  []    Site 5   [] Pistoning / []  Threading  []  Winding/Coning  []    Site 6   [] Pistoning / []  Threading  []  Winding/Coning  []    Site 7   [] Pistoning / []  Threading  []  Winding/Coning  []    Site 8   [] Pistoning / []  Threading  []  Winding/Coning  []    Site 9   [] Pistoning / []  Threading  []  Winding/Coning  []    Site 10   [] Pistoning / []  Threading  []  Winding/Coning  []      **The above techniques were used to restore functional range of motion, reduce muscle spasm, and induce healing in the corresponding musculature by means of intramuscular mobilization. Clean Technique was utilized today while applying the Dry needling treatment. The treatment sites where cleaned with 70% solution of isopropyl alcohol. The PT washed their hands and utilized treatment gloves along with hand  prior to inserting the needles. All needles where removed and discarded in the appropriate sharps container. MD has given verbal and/or written approval for this treatment. **          ** Educated patient on anatomy, trigger point etiology, expectations for TDN (bruising, soreness, etc), outcomes, and recommendations for exercise.  **    Therapeutic Ex (43638) Sets/sec Reps Notes/CUES   Hand/heel rock 5\" 10x                Cueing for TA activation   Patient ed   [place] in HEP            Wall sit  10\" 10x    Side plank with clamshell OVL  2  10 ea R/L              Mild pain   Standing HS stretch 30\" 3x ea R/L       Manual Intervention (28433)      Palpation and assessment of Taut bands and TPs for TDN 8'                   Lumbar PA's 8'                         NMR re-education (53167)   CUES NEEDED   Needs cueing   Needs cueing   Needs cueing      HL SLR flexion TA #3 2 10 ea R/L     Bridging on SB from floor with KB 5\" 20x    Table top swiss ball stabilization alternating  1 10 ea R/L  Alternating       1/2 kneeling chop Yellow MB 1 10x ea R/L    Feet staggered   TDN 3'                                         HEP instruction:   Access Code: [email or web address]  URL: [email or web address]/  Date: 05/24/2022  Prepared by: Ian Sullivan    Therapeutic Exercise and NMR EXR  [x] (69110) Provided verbal/tactile cueing for activities related to strengthening, flexibility, endurance, ROM  for improvements in proximal hip and core control with self care, mobility, lifting and ambulation. [x] (62317) Provided verbal/tactile cueing for activities related to improving balance, coordination, kinesthetic sense, posture, motor skill, proprioception  to assist with core control in self care, mobility, lifting, and ambulation.      Therapeutic Activities:    [] (43375 or 79992) Provided verbal/tactile cueing for activities related to improving balance, coordination, kinesthetic sense, posture, motor skill, proprioception and motor activation to allow for proper function  with self care and ADLs  [] (64309) Provided training and instruction to the patient for proper core and proximal hip recruitment and positioning with ambulation re-education     Home Exercise Program:    [x] (92861) Reviewed/Progressed HEP activities related to strengthening, flexibility, endurance, ROM of core, proximal hip and LE for functional self-care, mobility, lifting and ambulation   [] (47271) Reviewed/Progressed HEP activities related to improving balance, coordination, kinesthetic sense, posture, motor skill, proprioception of core, proximal hip and LE for self care, mobility, lifting, and ambulation      Manual Treatments:  PROM / STM / Oscillations-Mobs:  G-I, II, III, IV (PA's, Inf., Post.)  [x] (04385) Provided manual therapy to mobilize proximal hip and LS spine soft tissue/joints for the purpose of modulating pain, promoting relaxation,  increasing ROM, reducing/eliminating soft tissue Met: [] Adjusted  3. Patient will demonstrate an increase in Strength in bilateral hip abduction and ER to 4+/5 and good core activation to allow for proper functional mobility as indicated by patients Functional Deficits. [x] Progressing: [] Met: [] Not Met: [] Adjusted  4. Patient will be able to transfer sit <> stand consistently without increased symptoms or restriction. [] Progressing: [x] Met: [] Not Met: [] Adjusted  5. Patient will be able to ambulate for 30 minutes without increased symptoms or restrictions. (patient specific functional goal)    [] Progressing: [x] Met: [] Not Met: [] Adjusted     Progression Towards Functional goals:  [x] Patient is progressing as expected towards functional goals listed. [] Progression is slowed due to complexities listed. [] Progression has been slowed due to co-morbidities. [] Plan just implemented, too soon to assess goals progression  [] Other:     Overall Progression Towards Functional goals/ Treatment Progress Update:  [x] Patient is progressing as expected towards functional goals listed. [] Progression is slowed due to complexities/Impairments listed. [] Progression has been slowed due to co-morbidities. [] Plan just implemented, too soon to assess goals progression <30days   [] Goals require adjustment due to lack of progress  [] Patient is not progressing as expected and requires additional follow up with physician  [] Other    Prognosis for POC: [x] Good [] Fair  [] Poor      Patient requires continued skilled intervention: [x] Yes  [] No    Treatment/Activity Tolerance:  [x] Patient able to complete treatment  [] Patient limited by fatigue  [] Patient limited by pain    [] Patient limited by other medical complications  [] Other:     ASSESSMENT: Patient with less tightness with lumbar spine mobilizations this date. Multiple LTRs noted in gluteals. Progressing towards goals.     PLAN: See eval  [x] Continue per plan of care [] Alter current plan (see comments above)  [] Plan of care initiated [] Hold pending MD visit [] Discharge      Electronically signed by:  Brigid Arellano PT, DPT [id number]       Note: If patient does not return for scheduled/ recommended follow up visits, this note will serve as a discharge from care along with most recent update on progress.

## 2022-08-09 ENCOUNTER — HOSPITAL ENCOUNTER (OUTPATIENT)
Dept: PHYSICAL THERAPY | Age: 27
Setting detail: THERAPIES SERIES
Discharge: HOME | End: 2022-08-09

## 2022-08-09 PROCEDURE — 97140 MANUAL THERAPY 1/> REGIONS: CPT | Performed by: PHYSICAL THERAPIST

## 2022-08-09 PROCEDURE — 97110 THERAPEUTIC EXERCISES: CPT | Performed by: PHYSICAL THERAPIST

## 2022-08-09 PROCEDURE — 97112 NEUROMUSCULAR REEDUCATION: CPT | Performed by: PHYSICAL THERAPIST

## 2022-08-09 PROCEDURE — 20560 NDL INSJ W/O NJX 1 OR 2 MUSC: CPT | Performed by: PHYSICAL THERAPIST

## 2022-08-09 NOTE — FLOWSHEET NOTE
Winding/Coning Multiple large LTRs []    Site 2 Left upper trap x 2 .3x40mm [] Pistoning / [x]  Threading  []  Winding/Coning Multiple LTRs []    Site 3 C7 Multifidus B 0.3x50mm [] Pistoning / []  Threading  [x]  Winding/Coning  []    Site 4   [] Pistoning / []  Threading  []  Winding/Coning  []    Site 5   [] Pistoning / []  Threading  []  Winding/Coning  []    Site 6   [] Pistoning / []  Threading  []  Winding/Coning  []    Site 7   [] Pistoning / []  Threading  []  Winding/Coning  []    Site 8   [] Pistoning / []  Threading  []  Winding/Coning  []    Site 9   [] Pistoning / []  Threading  []  Winding/Coning  []    Site 10   [] Pistoning / []  Threading  []  Winding/Coning  []      **The above techniques were used to restore functional range of motion, reduce muscle spasm, and induce healing in the corresponding musculature by means of intramuscular mobilization. Clean Technique was utilized today while applying the Dry needling treatment. The treatment sites where cleaned with 70% solution of isopropyl alcohol. The PT washed their hands and utilized treatment gloves along with hand  prior to inserting the needles. All needles where removed and discarded in the appropriate sharps container. MD has given verbal and/or written approval for this treatment. **          ** Educated patient on anatomy, trigger point etiology, expectations for TDN (bruising, soreness, etc), outcomes, and recommendations for exercise.  **    Therapeutic Ex        Sets/sec Reps Notes   Standing UT stretch  20\" 4x ea B    Levator stretch 30\" 3x ea B       Wing armed breahing      With chintuck       Thoracic trunk and UE rotation with head stationary  1  10x ea       Easy             Patient ed 10'  Sleeping position, changing positions, and postural strength/endurance, to help reduce tightness and nerve tension, TDN     Manual Intervention           Palpation and assessment of TPs and taut bands for TDN 5'     STM bilateral upper traps post TDN, SOR, STM cervical paraspinals 5'     Prone T spine mobilizations Gr III 3'     Supine A to P thrust mid T spine 2'     Cervical side glides 3'     Gentle manual traction 2'     NMR re-education             Supine chin tucks 10\" 10x    Pain looking to left    HL TA with ADD with YTB hor ABD 2 10               Chin tucks supine with shoulder flexion  1 10x ea alternating   Prone HAB on SB 0#  3\" 15x Prone extension on SB #3 3\" 15x TDN           2'                 HEP instruction:   Access Code: [email or web address]  URL: [employer]/  Date: 06/28/2022  Prepared by: Jasen Swan      Therapeutic Exercise and NMR EXR  [x] (29472) Provided verbal/tactile cueing for activities related to strengthening, flexibility, endurance, ROM  for improvements in cervical, postural, scapular, scapulothoracic and UE control with self care, reaching, carrying, lifting, house/yardwork, driving/computer work. [x] (05519) Provided verbal/tactile cueing for activities related to improving balance, coordination, kinesthetic sense, posture, motor skill, proprioception  to assist with cervical, scapular, scapulothoracic and UE control with self care, reaching, carrying, lifting, house/yardwork, driving/computer work. Therapeutic Activities:    [] (75102 or 58243) Provided verbal/tactile cueing for activities related to improving balance, coordination, kinesthetic sense, posture, motor skill, proprioception and motor activation to allow for proper function of cervical, scapular, scapulothoracic and UE control with self care, carrying, lifting, driving/computer work.      Home Exercise Program:    [x] (25787) Reviewed/Progressed HEP activities related to strengthening, flexibility, endurance, ROM of cervical, scapular, scapulothoracic and UE control with self care, reaching, carrying, lifting, house/yardwork, driving/computer work  [] (01686) Reviewed/Progressed HEP activities related to improving balance, coordination, kinesthetic sense, posture, motor skill, proprioception of cervical, scapular, scapulothoracic and UE control with self care, reaching, carrying, lifting, house/yardwork, driving/computer work      Manual Treatments:  PROM / STM / Oscillations-Mobs:  G-I, II, III, IV (PA's, Inf., Post.)  [x] (99625) Provided manual therapy to mobilize soft tissue/joints of cervical/CT, scapular GHJ and UE for the purpose of decreasing headache, modulating pain, promoting relaxation,  increasing ROM, reducing/eliminating soft tissue swelling/inflammation/restriction, improving soft tissue extensibility and allowing for proper ROM for normal function with self care, reaching, carrying, lifting, house/yardwork, driving/computer work    Trigger point Dry Needling:  fine needle insertion into myofascial trigger points to stimulate a healing response  [x] (62718) Needle insertion without injection: 1 or 2 muscles  [] (70788) Needle insertion without injection: 3 or more muscles    Modalities:  Declined   Charges  Timed Code Treatment Minutes: 39'   Total Treatment Minutes: 52'     [] EVAL (LOW) 21173   [] EVAL (MOD) 09790   [] EVAL (HIGH) 49985   [] RE-EVAL     [x] PI(64710) x 1    [] IONTO  [x] NMR (47273) x 1  [] VASO  [x] Manual (04906) x 1  [x] TDN needle insertion (1-2)  [] TA x      [] Mech Traction (32402)  [] ES(attended) (64394)      [] ES (un) (51215):     GOALS:  Patient stated goal: \"to decrease pain and get back to more activities\"  [] Progressing: [] Met: [] Not Met: [] Adjusted    Therapist goals for Patient:   Short Term Goals: To be achieved in: 2 weeks  1. Independent in HEP and progression per patient tolerance, in order to prevent re-injury. [x] Progressing: [] Met: [] Not Met: [] Adjusted  2. Patient will have a decrease in pain to facilitate improvement in movement, function, and ADLs as indicated by Functional Deficits. [x] Progressing: [] Met: [] Not Met: [] Adjusted    Long Term Goals:  To be achieved in: 8 weeks  1. Disability index score of 61% or more for the FOTO Neck to assist with reaching prior level of function. [] Progressing: [] Met: [] Not Met: [] Adjusted  2. Patient will demonstrate increased AROM to of cervical rotation to 50 deg B and thoracic mobility to [facility] to allow for proper joint functioning as indicated by patients Functional Deficits. [x] Progressing: [] Met: [] Not Met: [] Adjusted  3. Patient will demonstrate an increase in postural awareness and control and activation of  Deep cervical stabilizers to allow for proper functional mobility as indicated by patients Functional Deficits. [] Progressing: [] Met: [] Not Met: [] Adjusted   4. Patient will be able to sleep without symptoms or restriction. [x] Progressing: [] Met: [] Not Met: [] Adjusted  5. Patient will be able to drive without increased symptoms or restrictions. (patient specific functional goal) [x] Progressing: [] Met: [] Not Met: [] Adjusted     Overall Progression Towards Functional goals/ Treatment Progress Update:  [] Patient is progressing as expected towards functional goals listed. [] Progression is slowed due to complexities/Impairments listed. [] Progression has been slowed due to co-morbidities. [x] Plan just implemented, too soon to assess goals progression <30days   [] Goals require adjustment due to lack of progress  [] Patient is not progressing as expected and requires additional follow up with physician  [] Other    Prognosis for POC: [x] Good [] Fair  [] Poor      Patient requires continued skilled intervention: [x] Yes  [] No      ASSESSMENT:  Patient continues to improve with cervical ROM and responded well again to TDN, with multiple LTRs elicited. Continues to be challenged by postural strengthening and endurnace.      Treatment/Activity Tolerance:  [x] Patient tolerated treatment well [] Patient limited by fatique  [] Patient limited by pain  [] Patient limited by other medical complications  [] Other:     Prognosis: [] Good [x] Fair  [] Poor    Patient Requires Follow-up: [x] Yes  [] No    PLAN: See eval  [x] Continue per plan of care [] Alter current plan (see comments above)  [] Plan of care initiated [] Hold pending MD visit [] Discharge      Electronically signed by:  Liane Malcolm PT, DPT [id number]       Note: If patient does not return for scheduled/ recommended follow up visits, this note will serve as a discharge from care along with most recent update on progress.

## 2022-08-15 ENCOUNTER — HOSPITAL ENCOUNTER (OUTPATIENT)
Dept: PHYSICAL THERAPY | Age: 27
Setting detail: THERAPIES SERIES
Discharge: HOME | End: 2022-08-15

## 2022-08-15 PROCEDURE — 97110 THERAPEUTIC EXERCISES: CPT

## 2022-08-15 PROCEDURE — 20560 NDL INSJ W/O NJX 1 OR 2 MUSC: CPT

## 2022-08-15 NOTE — FLOWSHEET NOTE
[facility], [address]  Phone: [phone]  Fax [phone]      Physical Therapy Treatment Note/ Progress Report:       Date:  8/15/2022    Patient Name:  Ariana Charles    :  1995  MRN: [id number]  Restrictions/Precautions:    Medical/Treatment Diagnosis Information:  Diagnosis: Strain of neck muscle (S16.1XXD), Cervical radiculitis (M54.12)  Treatment Diagnosis: Neck pain (M54.2), postural dysfunction (J91.4)  Insurance/Certification information:  PT Insurance Information: [employer] [place]  Physician Information:   LUCRECIA Greene  Has the plan of care been signed (Y/N):        [x]  Yes  []  No     Date of Patient follow up with Physician:     Is this a Progress Report:     []  Yes  [x]  No        If Yes:  Date Range for reporting period:  Beginnin22  Ending:     Progress report will be due (10 Rx or 30 days whichever is less): 96       Recertification will be due (POC Duration  / 90 days whichever is less):         Visit # Insurance Allowable Auth Required   In-person 8 (Neck) NO INS - SELF PAY []  Yes [x]  No      Functional Scale: FOTO neck 51/100 (51 %)    Date assessed:  22     Therapy Diagnosis/Practice Pattern: F      Number of Comorbidities:  []0     [x]1-2    []3+     Latex Allergy:  []NO      []YES  Preferred Language for Healthcare:   [x]English       []other:    Pain level:  3-4/10     SUBJECTIVE:  Patient reports that his neck is doing okay today. Seems to have calmed down from the flare up last week, but back to baseline soreness. OBJECTIVE: . Observation:   Test measurements:  NT this date    RESTRICTIONS/PRECAUTIONS:     Exercises/Interventions:   Consent signed 2022 and precautions addressed. See media tab.    Muscle  Needle Size Technique Notes IES   Site 1 Right upper trap x 4 .3x40mm [] Pistoning / [x]  Threading  []  Winding/Coning Multiple large LTRs []    Site 2 .3x40mm [] Pistoning / [x]  Threading  []  Winding/Coning Multiple LTRs []    Site 3 0.3x50mm [] Pistoning / []  Threading  [x]  Winding/Coning  []    Site 4   [] Pistoning / []  Threading  []  Winding/Coning  []    Site 5   [] Pistoning / []  Threading  []  Winding/Coning  []    Site 6   [] Pistoning / []  Threading  []  Winding/Coning  []    Site 7   [] Pistoning / []  Threading  []  Winding/Coning  []    Site 8   [] Pistoning / []  Threading  []  Winding/Coning  []    Site 9   [] Pistoning / []  Threading  []  Winding/Coning  []    Site 10   [] Pistoning / []  Threading  []  Winding/Coning  []      **The above techniques were used to restore functional range of motion, reduce muscle spasm, and induce healing in the corresponding musculature by means of intramuscular mobilization. Clean Technique was utilized today while applying the Dry needling treatment. The treatment sites where cleaned with 70% solution of isopropyl alcohol. The PT washed their hands and utilized treatment gloves along with hand  prior to inserting the needles. All needles where removed and discarded in the appropriate sharps container. MD has given verbal and/or written approval for this treatment. **          ** Educated patient on anatomy, trigger point etiology, expectations for TDN (bruising, soreness, etc), outcomes, and recommendations for exercise.  **    Therapeutic Ex        Sets/sec Reps Notes   With thaddeus             Easy             Patient ed 10'  Sleeping position, changing positions, and postural strength/endurance, to help reduce tightness and nerve tension, TDN           Quad Thread the needle  Quad protraction/Retraction x10 R/L  X10      Horizontal Abd  Mod plantigrade rotations 2x15  2x10 R/L GTB  RTB    Prone Row  Prone Extension  Prone Y 2x10  2x10  2x10 8#  5#  3#                Manual Intervention           Palpation and assessment of TPs and taut bands for TDN 5'                    NMR re-education                Pain looking to left             alternating   TDN                See below 8'           HEP instruction:   Access Code: [email or web address]  URL: [email or web address]  Date: 06/28/2022  Prepared by: Jaycob Garg      Therapeutic Exercise and NMR EXR  [x] (95915) Provided verbal/tactile cueing for activities related to strengthening, flexibility, endurance, ROM  for improvements in cervical, postural, scapular, scapulothoracic and UE control with self care, reaching, carrying, lifting, house/yardwork, driving/computer work. [x] (38379) Provided verbal/tactile cueing for activities related to improving balance, coordination, kinesthetic sense, posture, motor skill, proprioception  to assist with cervical, scapular, scapulothoracic and UE control with self care, reaching, carrying, lifting, house/yardwork, driving/computer work. Therapeutic Activities:    [] (77785 or 84532) Provided verbal/tactile cueing for activities related to improving balance, coordination, kinesthetic sense, posture, motor skill, proprioception and motor activation to allow for proper function of cervical, scapular, scapulothoracic and UE control with self care, carrying, lifting, driving/computer work.      Home Exercise Program:    [x] (37345) Reviewed/Progressed HEP activities related to strengthening, flexibility, endurance, ROM of cervical, scapular, scapulothoracic and UE control with self care, reaching, carrying, lifting, house/yardwork, driving/computer work  [] (95130) Reviewed/Progressed HEP activities related to improving balance, coordination, kinesthetic sense, posture, motor skill, proprioception of cervical, scapular, scapulothoracic and UE control with self care, reaching, carrying, lifting, house/yardwork, driving/computer work      Manual Treatments:  PROM / STM / Oscillations-Mobs:  G-I, II, III, IV (PA's, Inf., Post.)  [x] (07779) Provided manual therapy to mobilize soft tissue/joints of cervical/CT, scapular GHJ and UE for the purpose of decreasing headache, modulating pain, promoting relaxation,  increasing ROM, reducing/eliminating soft tissue swelling/inflammation/restriction, improving soft tissue extensibility and allowing for proper ROM for normal function with self care, reaching, carrying, lifting, house/yardwork, driving/computer work    Trigger point Dry Needling:  fine needle insertion into myofascial trigger points to stimulate a healing response  [x] (17749) Needle insertion without injection: 1 or 2 muscles  [] (87764) Needle insertion without injection: 3 or more muscles    Modalities:  Declined   Charges  Timed Code Treatment Minutes: 20   Total Treatment Minutes: 30     [] EVAL (LOW) 20250   [] EVAL (MOD) 37977   [] EVAL (HIGH) 13066   [] RE-EVAL     [x] FJ(18309) x 1    [] IONTO  [] NMR (73304) x 1  [] VASO  [] Manual (89306) x 1  [x] TDN needle insertion (1-2)  [] TA x      [] Mech Traction (90209)  [] ES(attended) (85576)      [] ES (un) (59572):     GOALS:  Patient stated goal: \"to decrease pain and get back to more activities\"  [] Progressing: [] Met: [] Not Met: [] Adjusted    Therapist goals for Patient:   Short Term Goals: To be achieved in: 2 weeks  1. Independent in HEP and progression per patient tolerance, in order to prevent re-injury. [x] Progressing: [] Met: [] Not Met: [] Adjusted  2. Patient will have a decrease in pain to facilitate improvement in movement, function, and ADLs as indicated by Functional Deficits. [x] Progressing: [] Met: [] Not Met: [] Adjusted    Long Term Goals: To be achieved in: 8 weeks  1. Disability index score of 61% or more for the FOTO Neck to assist with reaching prior level of function. [] Progressing: [] Met: [] Not Met: [] Adjusted  2. Patient will demonstrate increased AROM to of cervical rotation to 50 deg B and thoracic mobility to [facility] to allow for proper joint functioning as indicated by patients Functional Deficits.    [x] Progressing: [] Met: [] Not Met: [] Adjusted  3. Patient will demonstrate an increase in postural awareness and control and activation of  Deep cervical stabilizers to allow for proper functional mobility as indicated by patients Functional Deficits. [] Progressing: [] Met: [] Not Met: [] Adjusted   4. Patient will be able to sleep without symptoms or restriction. [x] Progressing: [] Met: [] Not Met: [] Adjusted  5. Patient will be able to drive without increased symptoms or restrictions. (patient specific functional goal) [x] Progressing: [] Met: [] Not Met: [] Adjusted     Overall Progression Towards Functional goals/ Treatment Progress Update:  [] Patient is progressing as expected towards functional goals listed. [] Progression is slowed due to complexities/Impairments listed. [] Progression has been slowed due to co-morbidities. [x] Plan just implemented, too soon to assess goals progression <30days   [] Goals require adjustment due to lack of progress  [] Patient is not progressing as expected and requires additional follow up with physician  [] Other    Prognosis for POC: [x] Good [] Fair  [] Poor      Patient requires continued skilled intervention: [x] Yes  [] No      ASSESSMENT:  Richard tolerated dry needling along with light resistance work focused on posterior cuff and scapular stabilizers. I feel that his discomfort is coming from inactivity in these muscles putting extra stress throughout neck and arm.  He would continue to benefit from skilled physical therapy to maximize his functional outcomes and progress towards goals    Treatment/Activity Tolerance:  [x] Patient tolerated treatment well [] Patient limited by fatique  [] Patient limited by pain  [] Patient limited by other medical complications  [] Other:     Prognosis: [] Good [x] Fair  [] Poor    Patient Requires Follow-up: [x] Yes  [] No    PLAN: See eval  [x] Continue per plan of care [] Alter current plan (see comments above)  [] Plan of care initiated [] Hold pending MD visit [] Discharge      Electronically signed by:  Varun Stockton, PT, DPT, SCS       Note: If patient does not return for scheduled/ recommended follow up visits, this note will serve as a discharge from care along with most recent update on progress.

## 2022-08-17 ENCOUNTER — HOSPITAL ENCOUNTER (OUTPATIENT)
Dept: PHYSICAL THERAPY | Age: 27
Setting detail: THERAPIES SERIES
Discharge: HOME | End: 2022-08-17

## 2022-08-17 PROCEDURE — 97110 THERAPEUTIC EXERCISES: CPT

## 2022-08-17 NOTE — FLOWSHEET NOTE
[facility], [address]  Phone: [phone]  Fax [phone]    Physical Therapy Treatment Note/ Progress Report:           Date:  2022    Patient Name:  Naima Henriquez    :  1995  MRN: [id number]  Restrictions/Precautions:    Medical/Treatment Diagnosis Information:  Diagnosis: Lumbar strain (S39.012D)  Treatment Diagnosis: Low back pain (K46.86)  Insurance/Certification information:  PT Insurance Information: Medical [place]  Physician Information:   Aba Condon  Has the plan of care been signed (Y/N):        [x]  Yes  []  No     Date of Patient follow up with Physician: not scheduled      Is this a Progress Report:     []  Yes (see above)  [x]  No        If Yes:  Date Range for reporting period:  Beginning:  Ending:     Progress report will be due (10 Rx or 30 days whichever is less):        Recertification will be due (POC Duration  / 90 days whichever is less):         Visit # Insurance Allowable Auth Required   In-person 12 (back) NO INS - SELF PAY []  Yes [x]  No          Functional Scale: FOTO lumbar 63/100     Date assessed:  22                     FOTO lumbar 94/100           22    Therapy Diagnosis/Practice Pattern: F      Number of Comorbidities:  [x]0     []1-2    []3+    Latex Allergy:  [x]NO      []YES  Preferred Language for Healthcare:   [x]English       []other:      Pain level:  0/10    SUBJECTIVE:  Patient reports no discomfort or issues with his back at this point. Feels near 100% better with his back and feels comfortable continuing work at home for his back.      OBJECTIVE:   Test Measurements: functional reassessment showed no true deficits outside of slight R sided discomfort with hyperextension and lateral flexion    RESTRICTIONS/PRECAUTIONS:     Exercises/Interventions:     Therapeutic Ex (53064) Sets/sec Reps Notes/CUES   Hand/heel rock 5\" 10x    Mobility for discharge HEP:  Wipers  Hamstring Floss  Child Pose  [place] Pose   X5 R/L  X5 R/L  2x5 ea way  2x15\" R/L        Manual Intervention (45128)                                 CUES NEEDED   Needs cueing   Needs cueing   Needs cueing            Alternating          Feet staggered                                       HEP instruction:   Access Code: [email or web address]  URL: [email or web address]  Date: 05/24/2022  Prepared by: Cassidy Salvador    Therapeutic Exercise and NMR EXR  [x] (34957) Provided verbal/tactile cueing for activities related to strengthening, flexibility, endurance, ROM  for improvements in proximal hip and core control with self care, mobility, lifting and ambulation. [x] (05948) Provided verbal/tactile cueing for activities related to improving balance, coordination, kinesthetic sense, posture, motor skill, proprioception  to assist with core control in self care, mobility, lifting, and ambulation.      Therapeutic Activities:    [] (60273 or 53054) Provided verbal/tactile cueing for activities related to improving balance, coordination, kinesthetic sense, posture, motor skill, proprioception and motor activation to allow for proper function  with self care and ADLs  [] (34810) Provided training and instruction to the patient for proper core and proximal hip recruitment and positioning with ambulation re-education     Home Exercise Program:    [x] (33009) Reviewed/Progressed HEP activities related to strengthening, flexibility, endurance, ROM of core, proximal hip and LE for functional self-care, mobility, lifting and ambulation   [] (33884) Reviewed/Progressed HEP activities related to improving balance, coordination, kinesthetic sense, posture, motor skill, proprioception of core, proximal hip and LE for self care, mobility, lifting, and ambulation      Manual Treatments:  PROM / STM / Oscillations-Mobs:  G-I, II, III, IV (PA's, Inf., Post.)  [x] (90266) Provided manual therapy to mobilize proximal hip and LS spine soft tissue/joints for the purpose of modulating pain, promoting relaxation,  increasing ROM, reducing/eliminating soft tissue swelling/inflammation/restriction, improving soft tissue extensibility and allowing for proper ROM for normal function with self care, mobility, lifting and ambulation. Modalities:   Declined modality this date    Trigger point Dry Needling:  fine needle insertion into myofascial trigger points to stimulate a healing response  [] (59826) Needle insertion without injection: 1 or 2 muscles  [] (69711) Needle insertion without injection: 3 or more muscles    Charges  Timed Code Treatment Minutes: 15   Total Treatment Minutes: 20     [] EVAL (LOW) 30485 (typically 20 minutes face-to-face)   [] EVAL (MOD) 50204 (typically 30 minutes face-to-face)  [] EVAL (HIGH) 00815 (typically 45 minutes face-to-face)  [] RE-EVAL     [x] HI(23933) x   1  [] IONTO  [] NMR (83811) x   [] VASO  [] Manual (27394) x    [] TDN needle insertion  [] TA x      [] Mech Traction (45262)  [] ES(attended) (05977)    [] ES (un) (40544):   [] EVAL (LOW) 96107   [] EVAL (MOD) 06666   [] EVAL (HIGH) 32033   [] RE-EVAL     GOALS:   Patient stated goal:   [] Progressing: [x] Met: [] Not Met: [] Adjusted    Therapist goals for Patient:   Short Term Goals: To be achieved in: 2 weeks  1. Independent in HEP and progression per patient tolerance, in order to prevent re-injury. [] Progressing: [x] Met: [] Not Met: [] Adjusted  2. Patient will have a decrease in pain to facilitate improvement in movement, function, and ADLs as indicated by Functional Deficits. [] Progressing: [x] Met: [] Not Met: [] Adjusted      Long Term Goals: To be achieved in: 6 more weeks from 7/21/22 (9/1/22)  1. Disability index score of 66% or more per FOTO to assist with reaching prior level of function. [] Progressing: [x] Met: [] Not Met: [] Adjusted  2.  Patient will demonstrate increased AROM to WNL, good LS mobility, good hip ROM to allow for proper joint functioning as indicated by patients Functional Deficits. [] Progressing: [x] Met: [] Not Met: [] Adjusted  3. Patient will demonstrate an increase in Strength in bilateral hip abduction and ER to 4+/5 and good core activation to allow for proper functional mobility as indicated by patients Functional Deficits. [] Progressing: [x] Met: [] Not Met: [] Adjusted  4. Patient will be able to transfer sit <> stand consistently without increased symptoms or restriction. [] Progressing: [x] Met: [] Not Met: [] Adjusted  5. Patient will be able to ambulate for 30 minutes without increased symptoms or restrictions. (patient specific functional goal)    [] Progressing: [x] Met: [] Not Met: [] Adjusted     Overall Progression Towards Functional goals/ Treatment Progress Update:  [x] Patient is progressing as expected towards functional goals listed. [] Progression is slowed due to complexities/Impairments listed. [] Progression has been slowed due to co-morbidities. [] Plan just implemented, too soon to assess goals progression <30days   [] Goals require adjustment due to lack of progress  [] Patient is not progressing as expected and requires additional follow up with physician  [] Other    Prognosis for POC: [x] Good [] Fair  [] Poor      Patient requires continued skilled intervention: [x] Yes  [] No    Treatment/Activity Tolerance:  [x] Patient able to complete treatment  [] Patient limited by fatigue  [] Patient limited by pain    [] Patient limited by other medical complications  [] Other:     ASSESSMENT: Tho Benson demonstrated no true deficits with re-evaluation this visit. He reports that he is comfortable with continuing his exercises at home and has been given a safe and progressive HEP with which he can continue his self care.      PLAN:  [] Continue per plan of care [] Alter current plan (see comments above)  [] Plan of care initiated [] Hold pending MD visit [x] Discharge from back episode      Electronically signed by:  Skye Berry, PT, DPT, SCS      Note: If patient does not return for scheduled/ recommended follow up visits, this note will serve as a discharge from care along with most recent update on progress.

## 2022-08-23 ENCOUNTER — HOSPITAL ENCOUNTER (OUTPATIENT)
Dept: PHYSICAL THERAPY | Age: 27
Setting detail: THERAPIES SERIES
Discharge: HOME | End: 2022-08-23

## 2022-08-23 PROCEDURE — 97112 NEUROMUSCULAR REEDUCATION: CPT | Performed by: PHYSICAL THERAPIST

## 2022-08-23 PROCEDURE — 20560 NDL INSJ W/O NJX 1 OR 2 MUSC: CPT | Performed by: PHYSICAL THERAPIST

## 2022-08-23 PROCEDURE — 97140 MANUAL THERAPY 1/> REGIONS: CPT | Performed by: PHYSICAL THERAPIST

## 2022-08-23 PROCEDURE — 97110 THERAPEUTIC EXERCISES: CPT | Performed by: PHYSICAL THERAPIST
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
## 2022-08-25 ENCOUNTER — HOSPITAL ENCOUNTER (OUTPATIENT)
Dept: PHYSICAL THERAPY | Age: 27
Setting detail: THERAPIES SERIES
Discharge: HOME | End: 2022-08-25

## 2022-08-25 PROCEDURE — 97140 MANUAL THERAPY 1/> REGIONS: CPT | Performed by: PHYSICAL THERAPIST

## 2022-08-25 PROCEDURE — 97110 THERAPEUTIC EXERCISES: CPT | Performed by: PHYSICAL THERAPIST

## 2022-08-25 NOTE — FLOWSHEET NOTE
[facility], [address]  Phone: [phone]  Fax [phone]      Physical Therapy Treatment Note/ Progress Report:       Date:  2022    Patient Name:  Jessica You    :  1995  MRN: [id number]  Restrictions/Precautions:    Medical/Treatment Diagnosis Information:  Diagnosis: Strain of neck muscle (S16.1XXD), Cervical radiculitis (M54.12)  Treatment Diagnosis: Neck pain (M54.2), postural dysfunction (A17.6)  Insurance/Certification information:  PT Insurance Information: Medical [place]  Physician Information:   LUCRECIA Ricardo  Has the plan of care been signed (Y/N):        [x]  Yes  []  No     Date of Patient follow up with Physician:     Is this a Progress Report:     []  Yes  [x]  No        If Yes:  Date Range for reporting period:  Beginnin22  Ending:     Progress report will be due (10 Rx or 30 days whichever is less):        Recertification will be due (POC Duration  / 90 days whichever is less):         Visit # Insurance Allowable Auth Required   In-person 10 (Neck) NO INS - SELF PAY []  Yes [x]  No      Functional Scale: FOTO neck 51/100 (51 %)    Date assessed:  22     Therapy Diagnosis/Practice Pattern: F      Number of Comorbidities:  []0     [x]1-2    []3+     Latex Allergy:  []NO      []YES  Preferred Language for Healthcare:   [x]English       []other:    Pain level:  210     SUBJECTIVE:  Patient reports that his neck was sore after last time but continues to feel pretty good. Still a low level soreness. OBJECTIVE: . Observation: less pa  Test measurements: POC UPDATE NV    RESTRICTIONS/PRECAUTIONS:     Exercises/Interventions:   Consent signed 2022 and precautions addressed. See media tab. Muscle  Needle Size Technique Notes IES   . 3x40mm []    . 3x40mm []    Site 3 0.3x50mm [] Pistoning / []  Threading  [x]  Winding/Coning  []    Site 4   [] Pistoning / []  Threading  []  Winding/Coning  []    Site 5   [] Pistoning / []  Threading  []  Winding/Coning  []    Site 6   [] Pistoning / []  Threading  []  Winding/Coning  []    Site 7   [] Pistoning / []  Threading  []  Winding/Coning  []    Site 8   [] Pistoning / []  Threading  []  Winding/Coning  []    Site 9   [] Pistoning / []  Threading  []  Winding/Coning  []    Site 10   [] Pistoning / []  Threading  []  Winding/Coning  []      **The above techniques were used to restore functional range of motion, reduce muscle spasm, and induce healing in the corresponding musculature by means of intramuscular mobilization. Clean Technique was utilized today while applying the Dry needling treatment. The treatment sites where cleaned with 70% solution of isopropyl alcohol. The PT washed their hands and utilized treatment gloves along with hand  prior to inserting the needles. All needles where removed and discarded in the appropriate sharps container. MD has given verbal and/or written approval for this treatment. **          ** Educated patient on anatomy, trigger point etiology, expectations for TDN (bruising, soreness, etc), outcomes, and recommendations for exercise.  **    Therapeutic Ex        Sets/sec Reps Notes   Scalene stretch with belt 20\" 4x ea R/L With chintuck             Lat pull down 40# 2 10 Needs cueing   Rows 45# 2 10 Good form         GTB ER 2 10x ea R/L          Quad protraction/Retraction 2 10    Patient ed   Lifting mechanics, gradually increasing activity to prepare fo r return to work                      Manual Intervention                  IASTM to B T spine paraspinals, framing around CT junction 10'    Prone T spine mobilizations Gr III 4'    Supine A to P thrust mid T spine 2' Cavitation noted   Cervical side glides 5'    Gentle manual traction 3'     NMR re-education             Supine chin tucks 10\" 10x    Pain looking to left             Chin tucks supine with shoulder flexion  1 10x ea alternating   Needs cueing   Needs cueing   TDN                      HEP instruction:   Access Code: [email or web address]  URL: [email or web address]  Date: 06/28/2022  Prepared by: Angelica Bazzi      Therapeutic Exercise and NMR EXR  [x] (07962) Provided verbal/tactile cueing for activities related to strengthening, flexibility, endurance, ROM  for improvements in cervical, postural, scapular, scapulothoracic and UE control with self care, reaching, carrying, lifting, house/yardwork, driving/computer work. [x] (76927) Provided verbal/tactile cueing for activities related to improving balance, coordination, kinesthetic sense, posture, motor skill, proprioception  to assist with cervical, scapular, scapulothoracic and UE control with self care, reaching, carrying, lifting, house/yardwork, driving/computer work. Therapeutic Activities:    [] (18600 or 45834) Provided verbal/tactile cueing for activities related to improving balance, coordination, kinesthetic sense, posture, motor skill, proprioception and motor activation to allow for proper function of cervical, scapular, scapulothoracic and UE control with self care, carrying, lifting, driving/computer work.      Home Exercise Program:    [x] (84729) Reviewed/Progressed HEP activities related to strengthening, flexibility, endurance, ROM of cervical, scapular, scapulothoracic and UE control with self care, reaching, carrying, lifting, house/yardwork, driving/computer work  [] (29160) Reviewed/Progressed HEP activities related to improving balance, coordination, kinesthetic sense, posture, motor skill, proprioception of cervical, scapular, scapulothoracic and UE control with self care, reaching, carrying, lifting, house/yardwork, driving/computer work      Manual Treatments:  PROM / STM / Oscillations-Mobs:  G-I, II, III, IV (PA's, Inf., Post.)  [x] (89327) Provided manual therapy to mobilize soft tissue/joints of cervical/CT, scapular GHJ and UE for the purpose of decreasing headache, modulating pain, promoting relaxation,  increasing ROM, reducing/eliminating soft tissue swelling/inflammation/restriction, improving soft tissue extensibility and allowing for proper ROM for normal function with self care, reaching, carrying, lifting, house/yardwork, driving/computer work    Trigger point Dry Needling:  fine needle insertion into myofascial trigger points to stimulate a healing response  [x] (51641) Needle insertion without injection: 1 or 2 muscles  [] (16104) Needle insertion without injection: 3 or more muscles    Modalities:  Declined   Charges  Timed Code Treatment Minutes: 39'   Total Treatment Minutes: 39'     [] EVAL (LOW) 06892   [] EVAL (MOD) 91730   [] EVAL (HIGH) 37403   [] RE-EVAL     [x] BK(54989) x 1    [] IONTO  [] NMR (37659) x   [] VASO  [x] Manual (01170) x 2  [] TDN needle insertion (1-2)  [] TA x      [] Mech Traction (11236)  [] ES(attended) (63644)      [] ES (un) (28297):     GOALS:  Patient stated goal: \"to decrease pain and get back to more activities\"  [] Progressing: [] Met: [] Not Met: [] Adjusted    Therapist goals for Patient:   Short Term Goals: To be achieved in: 2 weeks  1. Independent in HEP and progression per patient tolerance, in order to prevent re-injury. [x] Progressing: [] Met: [] Not Met: [] Adjusted  2. Patient will have a decrease in pain to facilitate improvement in movement, function, and ADLs as indicated by Functional Deficits. [x] Progressing: [] Met: [] Not Met: [] Adjusted    Long Term Goals: To be achieved in: 8 weeks  1. Disability index score of 61% or more for the FOTO Neck to assist with reaching prior level of function. [] Progressing: [] Met: [] Not Met: [] Adjusted  2. Patient will demonstrate increased AROM to of cervical rotation to 50 deg B and thoracic mobility to [facility] to allow for proper joint functioning as indicated by patients Functional Deficits.    [x] Progressing: [] Met: [] Not Met: [] Adjusted  3. Patient will demonstrate an increase in postural awareness and control and activation of  Deep cervical stabilizers to allow for proper functional mobility as indicated by patients Functional Deficits. [] Progressing: [] Met: [] Not Met: [] Adjusted   4. Patient will be able to sleep without symptoms or restriction. [x] Progressing: [] Met: [] Not Met: [] Adjusted  5. Patient will be able to drive without increased symptoms or restrictions. (patient specific functional goal) [x] Progressing: [] Met: [] Not Met: [] Adjusted     Overall Progression Towards Functional goals/ Treatment Progress Update:  [x] Patient is progressing as expected towards functional goals listed. [] Progression is slowed due to complexities/Impairments listed. [] Progression has been slowed due to co-morbidities. [] Plan just implemented, too soon to assess goals progression <30days   [] Goals require adjustment due to lack of progress  [] Patient is not progressing as expected and requires additional follow up with physician  [] Other    Prognosis for POC: [x] Good [] Fair  [] Poor      Patient requires continued skilled intervention: [x] Yes  [] No      ASSESSMENT:  Patient with with less tightness in upper trap since last visit. Did well with CC with min cueing needed during lat pull downs but overall is progressing well towards goals in regards to his neck.      Treatment/Activity Tolerance:  [x] Patient tolerated treatment well [] Patient limited by fatique  [] Patient limited by pain  [] Patient limited by other medical complications  [] Other:     Prognosis: [] Good [x] Fair  [] Poor    Patient Requires Follow-up: [x] Yes  [] No    PLAN: See eval  [x] Continue per plan of care [] Alter current plan (see comments above)  [] Plan of care initiated [] Hold pending MD visit [] Discharge      Electronically signed by:  Nithya Diaz, PT, DPT [id number]        Note: If patient does not return for scheduled/ recommended follow up visits, this note will serve as a discharge from care along with most recent update on progress.

## 2022-08-30 ENCOUNTER — APPOINTMENT (OUTPATIENT)
Dept: PHYSICAL THERAPY | Age: 27
End: 2022-08-30

## 2022-08-30 ENCOUNTER — TELEMEDICINE (OUTPATIENT)
Dept: INTERNAL MEDICINE CLINIC | Age: 27
End: 2022-08-30

## 2022-08-30 DIAGNOSIS — F43.22 ADJUSTMENT DISORDER WITH ANXIETY: Primary | ICD-10-CM

## 2022-08-30 PROBLEM — M54.41 RIGHT-SIDED LOW BACK PAIN WITH RIGHT-SIDED SCIATICA: Status: RESOLVED | Noted: 2022-02-02 | Resolved: 2022-08-30

## 2022-08-30 PROCEDURE — 99213 OFFICE O/P EST LOW 20 MIN: CPT | Performed by: INTERNAL MEDICINE

## 2022-08-30 ASSESSMENT — PATIENT HEALTH QUESTIONNAIRE - PHQ9
10. IF YOU CHECKED OFF ANY PROBLEMS, HOW DIFFICULT HAVE THESE PROBLEMS MADE IT FOR YOU TO DO YOUR WORK, TAKE CARE OF THINGS AT HOME, OR GET ALONG WITH OTHER PEOPLE: 1
SUM OF ALL RESPONSES TO PHQ QUESTIONS 1-9: 6
9. THOUGHTS THAT YOU WOULD BE BETTER OFF DEAD, OR OF HURTING YOURSELF: 0
6. FEELING BAD ABOUT YOURSELF - OR THAT YOU ARE A FAILURE OR HAVE LET YOURSELF OR YOUR FAMILY DOWN: 0
4. FEELING TIRED OR HAVING LITTLE ENERGY: 1
7. TROUBLE CONCENTRATING ON THINGS, SUCH AS READING THE NEWSPAPER OR WATCHING TELEVISION: 1
3. TROUBLE FALLING OR STAYING ASLEEP: 1
SUM OF ALL RESPONSES TO PHQ QUESTIONS 1-9: 6
SUM OF ALL RESPONSES TO PHQ QUESTIONS 1-9: 6
SUM OF ALL RESPONSES TO PHQ9 QUESTIONS 1 & 2: 2
2. FEELING DOWN, DEPRESSED OR HOPELESS: 1
1. LITTLE INTEREST OR PLEASURE IN DOING THINGS: 1
5. POOR APPETITE OR OVEREATING: 0
8. MOVING OR SPEAKING SO SLOWLY THAT OTHER PEOPLE COULD HAVE NOTICED. OR THE OPPOSITE, BEING SO FIGETY OR RESTLESS THAT YOU HAVE BEEN MOVING AROUND A LOT MORE THAN USUAL: 1
SUM OF ALL RESPONSES TO PHQ QUESTIONS 1-9: 6
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
## 2022-09-01 ENCOUNTER — HOSPITAL ENCOUNTER (OUTPATIENT)
Dept: PHYSICAL THERAPY | Age: 27
Setting detail: THERAPIES SERIES
Discharge: HOME | End: 2022-09-01

## 2022-09-01 PROCEDURE — 97110 THERAPEUTIC EXERCISES: CPT | Performed by: PHYSICAL THERAPIST

## 2022-09-01 PROCEDURE — 97140 MANUAL THERAPY 1/> REGIONS: CPT | Performed by: PHYSICAL THERAPIST

## 2022-09-01 PROCEDURE — 20560 NDL INSJ W/O NJX 1 OR 2 MUSC: CPT | Performed by: PHYSICAL THERAPIST

## 2022-09-01 NOTE — PLAN OF CARE
[facility], [address] [place] [address]  Phone: [phone]  Fax [phone]    Physical Therapy Re-Certification Plan of Care/MD UPDATE      Dear Floyd Prescott ,    We had the pleasure of treating the following patient for physical therapy services at [address] [place] [address]. A summary of our findings can be found in the updated assessment below. This includes our plan of care. If you have any questions or concerns regarding these findings, please do not hesitate to contact me at the office phone number checked above.   Thank you for the referral.     Physician Signature:________________________________Date:__________________  By signing above (or electronic signature), therapists plan is approved by physician    Date Range Of Visits: 22 -22  Total Visits to Date: 6  Overall Response to Treatment:   [x]Patient is responding well to treatment and improvement is noted with regards  to goals   []Patient should continue to improve in reasonable time if they continue HEP   []Patient has plateaued and is no longer responding to skilled PT intervention    []Patient is getting worse and would benefit from return to referring MD   []Patient unable to adhere to initial POC   []Other:       Physical Therapy Treatment Note/ Progress Report:       Date:  2022    Patient Name:  Moon Herrera    :  1995  MRN: [id number]  Restrictions/Precautions:    Medical/Treatment Diagnosis Information:  Diagnosis: Strain of neck muscle (S16.1XXD), Cervical radiculitis (M54.12)  Treatment Diagnosis: Neck pain (M54.2), postural dysfunction (V21.0)  Insurance/Certification information:  PT Insurance Information: [employer]  Physician Information:   LUCRECIA Prince  Has the plan of care been signed (Y/N):        [x]  Yes  []  No     Date of Patient follow up with Physician:     Is this a Progress Report: [x]  Yes (see above)  []  No        If Yes:  Date Range for reporting period:  Beginnin22  Ending:     Progress report will be due (10 Rx or 30 days whichever is less):       Recertification will be due (POC Duration  / 90 days whichever is less):  10/13/22       Visit # Insurance Allowable Auth Required   In-person 11 (Neck) NO INS - SELF PAY []  Yes [x]  No      Functional Scale: FOTO neck 51/100 (51 %)    Date assessed:  22     Therapy Diagnosis/Practice Pattern: F      Number of Comorbidities:  []0     [x]1-2    []3+     Latex Allergy:  []NO      []YES  Preferred Language for Healthcare:   [x]English       []other:    Pain level:  2/10     SUBJECTIVE:  Patient reports that his neck seems to be status quo. Low level soreness. Some nights he can sleep without issues, and other nights he tosses and turns because it hurts. OBJECTIVE: . Observation/Palpation: less TPs in B UT with palpation, stiffness noted CT junction and mid T spine  Test measurements: right cervical rotation 60 deg, left rotation 64 deg    RESTRICTIONS/PRECAUTIONS:     Exercises/Interventions:   Consent signed 2022 and precautions addressed. See media tab.    Muscle  Needle Size Technique Notes IES   Site 1 Right upper trap x 3 .3x40mm [] Pistoning / [x]  Threading  []  Winding/Coning Multiple large LTRs []    Site 2 Left upper trap x 3 .3x40mm [] Pistoning / [x]  Threading  []  Winding/Coning Multiple LTRs []    Site 3 0.3x50mm [] Pistoning / []  Threading  [x]  Winding/Coning  []    Site 4   [] Pistoning / []  Threading  []  Winding/Coning  []    Site 5   [] Pistoning / []  Threading  []  Winding/Coning  []    Site 6   [] Pistoning / []  Threading  []  Winding/Coning  []    Site 7   [] Pistoning / []  Threading  []  Winding/Coning  []    Site 8   [] Pistoning / []  Threading  []  Winding/Coning  []    Site 9   [] Pistoning / []  Threading  []  Winding/Coning  []    Site 10   [] Pistoning / []  Threading  [] Winding/Coning  []      **The above techniques were used to restore functional range of motion, reduce muscle spasm, and induce healing in the corresponding musculature by means of intramuscular mobilization. Clean Technique was utilized today while applying the Dry needling treatment. The treatment sites where cleaned with 70% solution of isopropyl alcohol. The PT washed their hands and utilized treatment gloves along with hand  prior to inserting the needles. All needles where removed and discarded in the appropriate sharps container. MD has given verbal and/or written approval for this treatment. **          ** Educated patient on anatomy, trigger point etiology, expectations for TDN (bruising, soreness, etc), outcomes, and recommendations for exercise. **    Therapeutic Ex        Sets/sec Reps Notes   Scalene stretch with belt 20\" 4x ea R/L With chintuck             Lat pull down 40# 2 10 Needs cueing   Rows 50# 2 10 Good form                     Patient ed   Continued focus on posture and stabilization exercises                      Manual Intervention           Palpation and assessment of TPs and taut bands for TDN 3'           Prone T spine mobilizations Gr III 3'    Supine A to P thrust mid T spine 2' Multiple Cavitations noted   Cervical side glides 5'    Gentle manual traction 3'     NMR re-education             Supine chin tucks 10\" 10x    Pain looking to left             Chin tucks supine with shoulder flexion  1 10x ea alternating   Needs cueing   Needs cueing   TDN                See above 2'       HEP instruction:   Access Code: [email or web address]  URL: [email or web address]  Date: 06/28/2022  Prepared by: Keanu Harding      Therapeutic Exercise and NMR EXR  [x] (08108) Provided verbal/tactile cueing for activities related to strengthening, flexibility, endurance, ROM  for improvements in cervical, postural, scapular, scapulothoracic and UE control with self care, reaching, carrying, lifting, house/yardwork, driving/computer work. [x] (68098) Provided verbal/tactile cueing for activities related to improving balance, coordination, kinesthetic sense, posture, motor skill, proprioception  to assist with cervical, scapular, scapulothoracic and UE control with self care, reaching, carrying, lifting, house/yardwork, driving/computer work. Therapeutic Activities:    [] (22202 or 22787) Provided verbal/tactile cueing for activities related to improving balance, coordination, kinesthetic sense, posture, motor skill, proprioception and motor activation to allow for proper function of cervical, scapular, scapulothoracic and UE control with self care, carrying, lifting, driving/computer work.      Home Exercise Program:    [x] (77062) Reviewed/Progressed HEP activities related to strengthening, flexibility, endurance, ROM of cervical, scapular, scapulothoracic and UE control with self care, reaching, carrying, lifting, house/yardwork, driving/computer work  [] (90183) Reviewed/Progressed HEP activities related to improving balance, coordination, kinesthetic sense, posture, motor skill, proprioception of cervical, scapular, scapulothoracic and UE control with self care, reaching, carrying, lifting, house/yardwork, driving/computer work      Manual Treatments:  PROM / STM / Oscillations-Mobs:  G-I, II, III, IV (PA's, Inf., Post.)  [x] (08032) Provided manual therapy to mobilize soft tissue/joints of cervical/CT, scapular GHJ and UE for the purpose of decreasing headache, modulating pain, promoting relaxation,  increasing ROM, reducing/eliminating soft tissue swelling/inflammation/restriction, improving soft tissue extensibility and allowing for proper ROM for normal function with self care, reaching, carrying, lifting, house/yardwork, driving/computer work    Trigger point Dry Needling:  fine needle insertion into myofascial trigger points to stimulate a healing response  [x] (42471) Needle insertion without injection: 1 or 2 muscles  [] (18268) Needle insertion without injection: 3 or more muscles    Modalities:  Declined   Charges  Timed Code Treatment Minutes: 35'   Total Treatment Minutes: 37'     [] EVAL (LOW) 17520   [] EVAL (MOD) 53380   [] EVAL (HIGH) 17871   [] RE-EVAL     [x] CZ(15217) x 1    [] IONTO  [] NMR (30012) x   [] VASO  [x] Manual (14716) x 1  [x] TDN needle insertion (1-2)  [] TA x      [] Mech Traction (84169)  [] ES(attended) (72989)      [] ES (un) (69136):     GOALS:  Patient stated goal: \"to decrease pain and get back to more activities\"  [] Progressing: [] Met: [] Not Met: [] Adjusted    Therapist goals for Patient:   Short Term Goals: To be achieved in: 2 weeks  1. Independent in HEP and progression per patient tolerance, in order to prevent re-injury. [] Progressing: [x] Met: [] Not Met: [] Adjusted  2. Patient will have a decrease in pain to facilitate improvement in movement, function, and ADLs as indicated by Functional Deficits. [x] Progressing: [] Met: [] Not Met: [] Adjusted    Long Term Goals: To be achieved in: 1x/wk for 4-6 weeks (10/13/22)  1. Disability index score of 61% or more for the FOTO Neck to assist with reaching prior level of function. [] Progressing: [] Met: [] Not Met: [] Adjusted  2. Patient will demonstrate increased AROM to of cervical rotation to 50 deg B and thoracic mobility to [facility] to allow for proper joint functioning as indicated by patients Functional Deficits. [x] Progressing: [] Met: [] Not Met: [] Adjusted  3. Patient will demonstrate an increase in postural awareness and control and activation of  Deep cervical stabilizers to allow for proper functional mobility as indicated by patients Functional Deficits. [x] Progressing: [] Met: [] Not Met: [] Adjusted   4. Patient will be able to sleep without symptoms or restriction. [x] Progressing: [] Met: [] Not Met: [] Adjusted  5. Patient will be able to drive without increased symptoms or restrictions. (patient specific functional goal) [] Progressing: [x] Met: [] Not Met: [] Adjusted     Overall Progression Towards Functional goals/ Treatment Progress Update:  [x] Patient is progressing as expected towards functional goals listed. [] Progression is slowed due to complexities/Impairments listed. [] Progression has been slowed due to co-morbidities. [] Plan just implemented, too soon to assess goals progression <30days   [] Goals require adjustment due to lack of progress  [] Patient is not progressing as expected and requires additional follow up with physician  [] Other    Prognosis for POC: [x] Good [] Fair  [] Poor      Patient requires continued skilled intervention: [x] Yes  [] No      ASSESSMENT:  Patient with significant increase in pain-free ROM since last PN. Continues to demonstrate tightness and TPs in upper traps, with multiple LTRs with TDN. However, they are much less tight than at initial visit. Patient is progressing well at this time. Would benefit from continued skilled PT to address stiffness in C/T spine and increase stability while reducing muscle tightness so patient can return to PLOF. Treatment/Activity Tolerance:  [x] Patient tolerated treatment well [] Patient limited by fatique  [] Patient limited by pain  [] Patient limited by other medical complications  [] Other:     Prognosis: [] Good [x] Fair  [] Poor    Patient Requires Follow-up: [x] Yes  [] No    PLAN: See eval  [x] Continue per plan of care [] Alter current plan (see comments above)  [] Plan of care initiated [] Hold pending MD visit [] Discharge      Electronically signed by:  Liane Malcolm, PT, DPT [id number]        Note: If patient does not return for scheduled/ recommended follow up visits, this note will serve as a discharge from care along with most recent update on progress.

## 2022-09-06 ENCOUNTER — HOSPITAL ENCOUNTER (OUTPATIENT)
Dept: PHYSICAL THERAPY | Age: 27
Setting detail: THERAPIES SERIES
Discharge: HOME | End: 2022-09-06

## 2022-09-06 PROCEDURE — 20560 NDL INSJ W/O NJX 1 OR 2 MUSC: CPT | Performed by: PHYSICAL THERAPIST

## 2022-09-06 PROCEDURE — 97110 THERAPEUTIC EXERCISES: CPT | Performed by: PHYSICAL THERAPIST

## 2022-09-06 PROCEDURE — 97140 MANUAL THERAPY 1/> REGIONS: CPT | Performed by: PHYSICAL THERAPIST

## 2022-09-06 NOTE — PLAN OF CARE
[facility], [address] [facility] [place]  Phone: [phone]  Fax [phone]    Physical Therapy Treatment Note/ Progress Report:       Date:  2022    Patient Name:  Leola Dang    :  1995  MRN: [id number]  Restrictions/Precautions:    Medical/Treatment Diagnosis Information:  Diagnosis: Strain of neck muscle (S16.1XXD), Cervical radiculitis (M54.12)  Treatment Diagnosis: Neck pain (M54.2), postural dysfunction (V89.1)  Insurance/Certification information:  PT Insurance Information: [employer]  Physician Information:   Micheal Gottron, PA  Has the plan of care been signed (Y/N):        [x]  Yes  []  No     Date of Patient follow up with Physician:     Is this a Progress Report:     []  Yes (see above)  [x]  No        If Yes:  Date Range for reporting period:  Beginnin22  Ending:     Progress report will be due (10 Rx or 30 days whichever is less):       Recertification will be due (POC Duration  / 90 days whichever is less):  10/13/22       Visit # Insurance Allowable Auth Required   In-person 12 (Neck) NO INS - SELF PAY []  Yes [x]  No      Functional Scale: FOTO neck 74/100 (74 %)    Date assessed:  22     Therapy Diagnosis/Practice Pattern: F      Number of Comorbidities:  []0     [x]1-2    []3+     Latex Allergy:  []NO      []YES  Preferred Language for Healthcare:   [x]English       []other:    Pain level:  0-2/10     SUBJECTIVE:  Patient reports that his neck has not been hurting at all during the day. Sore at night. OBJECTIVE: . Observation/Palpation: less TPs in B UT with palpation, stiffness noted CT junction and mid T spine  Test measurements: NT this date    RESTRICTIONS/PRECAUTIONS:     Exercises/Interventions:   Consent signed 2022 and precautions addressed. See media tab.    Muscle  Needle Size Technique Notes IES   Site 1 Right upper trap x 3 .3x40mm [] Pistoning / [x]  Threading  []  Winding/Coning Multiple large LTRs []    Site 2 Left upper trap x 3 .3x40mm [] Pistoning / [x]  Threading  []  Winding/Coning Multiple LTRs []    Site 3 0.3x50mm [] Pistoning / []  Threading  [x]  Winding/Coning  []    Site 4   [] Pistoning / []  Threading  []  Winding/Coning  []    Site 5   [] Pistoning / []  Threading  []  Winding/Coning  []    Site 6   [] Pistoning / []  Threading  []  Winding/Coning  []    Site 7   [] Pistoning / []  Threading  []  Winding/Coning  []    Site 8   [] Pistoning / []  Threading  []  Winding/Coning  []    Site 9   [] Pistoning / []  Threading  []  Winding/Coning  []    Site 10   [] Pistoning / []  Threading  []  Winding/Coning  []      **The above techniques were used to restore functional range of motion, reduce muscle spasm, and induce healing in the corresponding musculature by means of intramuscular mobilization. Clean Technique was utilized today while applying the Dry needling treatment. The treatment sites where cleaned with 70% solution of isopropyl alcohol. The PT washed their hands and utilized treatment gloves along with hand  prior to inserting the needles. All needles where removed and discarded in the appropriate sharps container. MD has given verbal and/or written approval for this treatment. **          ** Educated patient on anatomy, trigger point etiology, expectations for TDN (bruising, soreness, etc), outcomes, and recommendations for exercise.  **    Therapeutic Ex        Sets/sec Reps Notes   Scalene stretch with belt 20\" 4x ea R/L With chintuck             Lat pull down 40# 2 15 Needs cueing   Rows 50# 2 15 Good form                     Patient ed   Continued focus on posture and stabilization exercises                      Manual Intervention           Palpation and assessment of TPs and taut bands for TDN 3'           Prone PAs at CTJ 3'    Supine A to P thrust mid T spine 2' Multiple Cavitations noted   Cervical side glides 5' Manual Treatments:  PROM / STM / Oscillations-Mobs:  G-I, II, III, IV (PA's, Inf., Post.)  [x] (37654) Provided manual therapy to mobilize soft tissue/joints of cervical/CT, scapular GHJ and UE for the purpose of decreasing headache, modulating pain, promoting relaxation,  increasing ROM, reducing/eliminating soft tissue swelling/inflammation/restriction, improving soft tissue extensibility and allowing for proper ROM for normal function with self care, reaching, carrying, lifting, house/yardwork, driving/computer work    Trigger point Dry Needling:  fine needle insertion into myofascial trigger points to stimulate a healing response  [x] (10762) Needle insertion without injection: 1 or 2 muscles  [] (40521) Needle insertion without injection: 3 or more muscles    Modalities:  Declined   Charges  Timed Code Treatment Minutes: 35'   Total Treatment Minutes: 37'     [] EVAL (LOW) 85520   [] EVAL (MOD) 65300   [] EVAL (HIGH) 22006   [] RE-EVAL     [x] QP(29419) x 1    [] IONTO  [] NMR (91658) x   [] VASO  [x] Manual (64205) x 1  [x] TDN needle insertion (1-2)  [] TA x      [] Mech Traction (80520)  [] ES(attended) (13615)      [] ES (un) (43992):     GOALS:  Patient stated goal: \"to decrease pain and get back to more activities\"  [] Progressing: [] Met: [] Not Met: [] Adjusted    Therapist goals for Patient:   Short Term Goals: To be achieved in: 2 weeks  1. Independent in HEP and progression per patient tolerance, in order to prevent re-injury. [] Progressing: [x] Met: [] Not Met: [] Adjusted  2. Patient will have a decrease in pain to facilitate improvement in movement, function, and ADLs as indicated by Functional Deficits. [x] Progressing: [] Met: [] Not Met: [] Adjusted    Long Term Goals: To be achieved in: 1x/wk for 4-6 weeks (10/13/22)  1. Disability index score of 61% or more for the FOTO Neck to assist with reaching prior level of function. [] Progressing: [x] Met: [] Not Met: [] Adjusted  2.  Patient will demonstrate increased AROM to of cervical rotation to 50 deg B and thoracic mobility to [facility] to allow for proper joint functioning as indicated by patients Functional Deficits. [] Progressing: [x] Met: [] Not Met: [] Adjusted  3. Patient will demonstrate an increase in postural awareness and control and activation of  Deep cervical stabilizers to allow for proper functional mobility as indicated by patients Functional Deficits. [x] Progressing: [] Met: [] Not Met: [] Adjusted   4. Patient will be able to sleep without symptoms or restriction. [x] Progressing: [] Met: [] Not Met: [] Adjusted  5. Patient will be able to drive without increased symptoms or restrictions. (patient specific functional goal) [] Progressing: [x] Met: [] Not Met: [] Adjusted     Overall Progression Towards Functional goals/ Treatment Progress Update:  [x] Patient is progressing as expected towards functional goals listed. [] Progression is slowed due to complexities/Impairments listed. [] Progression has been slowed due to co-morbidities. [] Plan just implemented, too soon to assess goals progression <30days   [] Goals require adjustment due to lack of progress  [] Patient is not progressing as expected and requires additional follow up with physician  [] Other    Prognosis for POC: [x] Good [] Fair  [] Poor      Patient requires continued skilled intervention: [x] Yes  [] No      ASSESSMENT:  Patient progressing well at this time, with pain only when trying to sleep. Educated on gradually increasing lifting routine at home including biceps, triceps, lat pull downs, and rows. Will continue to progress as tolerated.      Treatment/Activity Tolerance:  [x] Patient tolerated treatment well [] Patient limited by fatique  [] Patient limited by pain  [] Patient limited by other medical complications  [] Other:     Prognosis: [] Good [x] Fair  [] Poor    Patient Requires Follow-up: [x] Yes  [] No    PLAN: See eval  [x] Continue per plan of care [] Alter current plan (see comments above)  [] Plan of care initiated [] Hold pending MD visit [] Discharge      Electronically signed by:  Patito Sigala PT, DPT [id number]        Note: If patient does not return for scheduled/ recommended follow up visits, this note will serve as a discharge from care along with most recent update on progress.

## 2022-09-14 ENCOUNTER — HOSPITAL ENCOUNTER (OUTPATIENT)
Dept: PHYSICAL THERAPY | Age: 27
Setting detail: THERAPIES SERIES
Discharge: HOME | End: 2022-09-14

## 2022-09-14 PROCEDURE — 20560 NDL INSJ W/O NJX 1 OR 2 MUSC: CPT

## 2022-09-14 PROCEDURE — 97140 MANUAL THERAPY 1/> REGIONS: CPT

## 2022-09-14 NOTE — FLOWSHEET NOTE
[facility], [address]  Phone: [phone]  Fax [phone]    Physical Therapy Treatment Note/ Progress Report:       Date:  2022    Patient Name:  Milton Montes    :  1995  MRN: [id number]  Restrictions/Precautions:    Medical/Treatment Diagnosis Information:  Diagnosis: Strain of neck muscle (S16.1XXD), Cervical radiculitis (M54.12)  Treatment Diagnosis: Neck pain (M54.2), postural dysfunction (T22.0)  Insurance/Certification information:  PT Insurance Information: Medical [place]  Physician Information:   LUCRECIA Lowe  Has the plan of care been signed (Y/N):        [x]  Yes  []  No     Date of Patient follow up with Physician:     Is this a Progress Report:     []  Yes (see above)  [x]  No        If Yes:  Date Range for reporting period:  Beginnin22  Ending:     Progress report will be due (10 Rx or 30 days whichever is less):       Recertification will be due (POC Duration  / 90 days whichever is less):  10/13/22       Visit # Insurance Allowable Auth Required   In-person 13 (Neck) NO INS - SELF PAY []  Yes [x]  No      Functional Scale: FOTO neck 74/100 (74 %)    Date assessed:  22     Therapy Diagnosis/Practice Pattern: F      Number of Comorbidities:  []0     [x]1-2    []3+     Latex Allergy:  []NO      []YES  Preferred Language for Healthcare:   [x]English       []other:    Pain level:  0-2/10     SUBJECTIVE:  Patient reports continued discomfort in his upper trap at night. OBJECTIVE: . Observation/Palpation: general TrP noted in levator scap proximal upper trap  Test measurements: NT this date    RESTRICTIONS/PRECAUTIONS:     Exercises/Interventions:   Consent signed 2022 and precautions addressed. See media tab. Muscle  Needle Size Technique Notes IES   Site 1 Right upper trap  . 3x40mm [] Pistoning / [x]  Threading  []  Winding/Coning Multiple large LTRs []    Site 2 R Levator Scap . 3x40mm [] Pistoning / [x]  Threading  []  Winding/Coning Multiple LTRs []    Site 3 0.3x50mm [] Pistoning / []  Threading  [x]  Winding/Coning  []    Site 4   [] Pistoning / []  Threading  []  Winding/Coning  []    Site 5   [] Pistoning / []  Threading  []  Winding/Coning  []    Site 6   [] Pistoning / []  Threading  []  Winding/Coning  []    Site 7   [] Pistoning / []  Threading  []  Winding/Coning  []    Site 8   [] Pistoning / []  Threading  []  Winding/Coning  []    Site 9   [] Pistoning / []  Threading  []  Winding/Coning  []    Site 10   [] Pistoning / []  Threading  []  Winding/Coning  []      **The above techniques were used to restore functional range of motion, reduce muscle spasm, and induce healing in the corresponding musculature by means of intramuscular mobilization. Clean Technique was utilized today while applying the Dry needling treatment. The treatment sites where cleaned with 70% solution of isopropyl alcohol. The PT washed their hands and utilized treatment gloves along with hand  prior to inserting the needles. All needles where removed and discarded in the appropriate sharps container. MD has given verbal and/or written approval for this treatment. **          ** Educated patient on anatomy, trigger point etiology, expectations for TDN (bruising, soreness, etc), outcomes, and recommendations for exercise.  **    Therapeutic Ex        Sets/sec Reps Notes   With chintuck       Thoracic trunk rotations with TB  Shrug + carry 2  3 10x ea  10x GTB  #15                            Patient ed   Continued focus on posture and stabilization exercises                      Manual Intervention           Palpation and assessment of TPs and taut bands for TDN 3'           Prone PAs at CTJ 3'    Supine A to P thrust mid T spine 2' Multiple Cavitations noted   Cervical side glides 5'    Gentle manual traction 2'     NMR re-education                Chin tucks supine with rotation  1  5 ea direction  Pain looking to left             Chin tucks supine with shoulder flexion  1 10x ea alternating   Needs cueing   Needs cueing   TDN                See above 2'       HEP instruction:   Access Code: [email or web address]  URL: [email or web address]  Date: 06/28/2022  Prepared by: Jasen Swan      Therapeutic Exercise and NMR EXR  [x] (39530) Provided verbal/tactile cueing for activities related to strengthening, flexibility, endurance, ROM  for improvements in cervical, postural, scapular, scapulothoracic and UE control with self care, reaching, carrying, lifting, house/yardwork, driving/computer work. [x] (49411) Provided verbal/tactile cueing for activities related to improving balance, coordination, kinesthetic sense, posture, motor skill, proprioception  to assist with cervical, scapular, scapulothoracic and UE control with self care, reaching, carrying, lifting, house/yardwork, driving/computer work. Therapeutic Activities:    [] (64550 or 21160) Provided verbal/tactile cueing for activities related to improving balance, coordination, kinesthetic sense, posture, motor skill, proprioception and motor activation to allow for proper function of cervical, scapular, scapulothoracic and UE control with self care, carrying, lifting, driving/computer work.      Home Exercise Program:    [x] (34305) Reviewed/Progressed HEP activities related to strengthening, flexibility, endurance, ROM of cervical, scapular, scapulothoracic and UE control with self care, reaching, carrying, lifting, house/yardwork, driving/computer work  [] (00327) Reviewed/Progressed HEP activities related to improving balance, coordination, kinesthetic sense, posture, motor skill, proprioception of cervical, scapular, scapulothoracic and UE control with self care, reaching, carrying, lifting, house/yardwork, driving/computer work      Manual Treatments:  PROM / STM / Oscillations-Mobs:  G-I, II, III, IV (PA's, Inf., to [facility] to allow for proper joint functioning as indicated by patients Functional Deficits. [] Progressing: [x] Met: [] Not Met: [] Adjusted  3. Patient will demonstrate an increase in postural awareness and control and activation of  Deep cervical stabilizers to allow for proper functional mobility as indicated by patients Functional Deficits. [x] Progressing: [] Met: [] Not Met: [] Adjusted   4. Patient will be able to sleep without symptoms or restriction. [x] Progressing: [] Met: [] Not Met: [] Adjusted  5. Patient will be able to drive without increased symptoms or restrictions. (patient specific functional goal) [] Progressing: [x] Met: [] Not Met: [] Adjusted     Overall Progression Towards Functional goals/ Treatment Progress Update:  [x] Patient is progressing as expected towards functional goals listed. [] Progression is slowed due to complexities/Impairments listed. [] Progression has been slowed due to co-morbidities. [] Plan just implemented, too soon to assess goals progression <30days   [] Goals require adjustment due to lack of progress  [] Patient is not progressing as expected and requires additional follow up with physician  [] Other    Prognosis for POC: [x] Good [] Fair  [] Poor      Patient requires continued skilled intervention: [x] Yes  [] No      ASSESSMENT:  Richard tolerated needling, reported increased LTR with needling this session. Will continue to progress as tolerated.      Treatment/Activity Tolerance:  [x] Patient tolerated treatment well [] Patient limited by fatique  [] Patient limited by pain  [] Patient limited by other medical complications  [] Other:     Prognosis: [] Good [x] Fair  [] Poor    Patient Requires Follow-up: [x] Yes  [] No    PLAN: See eval  [x] Continue per plan of care [] Alter current plan (see comments above)  [] Plan of care initiated [] Hold pending MD visit [] Discharge      Electronically signed by:  Elicia Iraheta PT, DPT       Note: If patient does not return for scheduled/ recommended follow up visits, this note will serve as a discharge from care along with most recent update on progress.

## 2022-09-20 ENCOUNTER — HOSPITAL ENCOUNTER (OUTPATIENT)
Dept: PHYSICAL THERAPY | Age: 27
Setting detail: THERAPIES SERIES
Discharge: HOME | End: 2022-09-20

## 2022-09-20 PROCEDURE — 20560 NDL INSJ W/O NJX 1 OR 2 MUSC: CPT | Performed by: PHYSICAL THERAPIST

## 2022-09-20 PROCEDURE — 97110 THERAPEUTIC EXERCISES: CPT | Performed by: PHYSICAL THERAPIST

## 2022-09-20 PROCEDURE — 97140 MANUAL THERAPY 1/> REGIONS: CPT | Performed by: PHYSICAL THERAPIST

## 2022-09-20 NOTE — FLOWSHEET NOTE
[facility], [address] [place]  Phone: [phone]  Fax [phone]    Physical Therapy Treatment Note/ Progress Report:       Date:  2022    Patient Name:  Arcadio Bliss    :  1995  MRN: [id number]  Restrictions/Precautions:    Medical/Treatment Diagnosis Information:  Diagnosis: Strain of neck muscle (S16.1XXD), Cervical radiculitis (M54.12)  Treatment Diagnosis: Neck pain (M54.2), postural dysfunction (D70.7)  Insurance/Certification information:  PT Insurance Information: Medical [place]  Physician Information:   LUCRECIA Trotter  Has the plan of care been signed (Y/N):        [x]  Yes  []  No     Date of Patient follow up with Physician:     Is this a Progress Report:     []  Yes (see above)  [x]  No        If Yes:  Date Range for reporting period:  Beginnin22  Ending:     Progress report will be due (10 Rx or 30 days whichever is less):       Recertification will be due (POC Duration  / 90 days whichever is less):  10/13/22       Visit # Insurance Allowable Auth Required   In-person 14 (Neck) NO INS - SELF PAY []  Yes [x]  No      Functional Scale: FOTO neck 74/100 (74 %)    Date assessed:  22     Therapy Diagnosis/Practice Pattern: F      Number of Comorbidities:  []0     [x]1-2    []3+     Latex Allergy:  []NO      []YES  Preferred Language for Healthcare:   [x]English       []other:    Pain level:  0-2/10     SUBJECTIVE:  Patient reports that he only has pain in the morning, but overall feels like he is doing well. OBJECTIVE: . Observation/Palpation: general TrP noted in levator scap proximal upper trap  Test measurements: NT this date    RESTRICTIONS/PRECAUTIONS:     Exercises/Interventions:   Consent signed 2022 and precautions addressed. See media tab.    Muscle  Needle Size Technique Notes IES   Site 1 Right upper trap x3 .3x40mm [] Pistoning / [x]  Threading  [] Winding/Coning Multiple large LTRs []    Site 2 .3x40mm [] Pistoning / [x]  Threading  []  Winding/Coning Multiple LTRs []    Site 3 C7 Multifidus B 0.3x50mm [] Pistoning / []  Threading  [x]  Winding/Coning  []    Site 4   [] Pistoning / []  Threading  []  Winding/Coning  []    Site 5   [] Pistoning / []  Threading  []  Winding/Coning  []    Site 6   [] Pistoning / []  Threading  []  Winding/Coning  []    Site 7   [] Pistoning / []  Threading  []  Winding/Coning  []    Site 8   [] Pistoning / []  Threading  []  Winding/Coning  []    Site 9   [] Pistoning / []  Threading  []  Winding/Coning  []    Site 10   [] Pistoning / []  Threading  []  Winding/Coning  []      **The above techniques were used to restore functional range of motion, reduce muscle spasm, and induce healing in the corresponding musculature by means of intramuscular mobilization. Clean Technique was utilized today while applying the Dry needling treatment. The treatment sites where cleaned with 70% solution of isopropyl alcohol. The PT washed their hands and utilized treatment gloves along with hand  prior to inserting the needles. All needles where removed and discarded in the appropriate sharps container. MD has given verbal and/or written approval for this treatment. **          ** Educated patient on anatomy, trigger point etiology, expectations for TDN (bruising, soreness, etc), outcomes, and recommendations for exercise.  **    Therapeutic Ex        Sets/sec Reps Notes   [place] stretch with belt 20\" 4x ea R/L Doorway pec stretch 30\" 3x Feet staggered   Wing armed breahing 1 10 With chintuck       GTB  #15    true stretch flexion  20\" 5x                         Patient ed   1-2 more visits then DC with HEP                     Manual Intervention           Palpation and assessment of TPs and taut bands for TDN 3'           Prone PAs at CTJ 4'    Supine A to P thrust mid T spine 2' Multiple Cavitations noted   Cervical side glides 5' to 50 deg B and thoracic mobility to [facility] to allow for proper joint functioning as indicated by patients Functional Deficits. [] Progressing: [x] Met: [] Not Met: [] Adjusted  3. Patient will demonstrate an increase in postural awareness and control and activation of  Deep cervical stabilizers to allow for proper functional mobility as indicated by patients Functional Deficits. [x] Progressing: [] Met: [] Not Met: [] Adjusted   4. Patient will be able to sleep without symptoms or restriction. [x] Progressing: [] Met: [] Not Met: [] Adjusted  5. Patient will be able to drive without increased symptoms or restrictions. (patient specific functional goal) [] Progressing: [x] Met: [] Not Met: [] Adjusted     Overall Progression Towards Functional goals/ Treatment Progress Update:  [x] Patient is progressing as expected towards functional goals listed. [] Progression is slowed due to complexities/Impairments listed. [] Progression has been slowed due to co-morbidities. [] Plan just implemented, too soon to assess goals progression <30days   [] Goals require adjustment due to lack of progress  [] Patient is not progressing as expected and requires additional follow up with physician  [] Other    Prognosis for POC: [x] Good [] Fair  [] Poor      Patient requires continued skilled intervention: [x] Yes  [] No      ASSESSMENT:  Patient progressing well at this time. Will skip one week and work on [employer]. May DC after 1-2 visits pending progress.     Treatment/Activity Tolerance:  [x] Patient tolerated treatment well [] Patient limited by fatique  [] Patient limited by pain  [] Patient limited by other medical complications  [] Other:     Prognosis: [] Good [x] Fair  [] Poor    Patient Requires Follow-up: [x] Yes  [] No    PLAN: See eval  [x] Continue per plan of care [] Alter current plan (see comments above)  [] Plan of care initiated [] Hold pending MD visit [] Discharge      Electronically signed by:  Pam Don PT, DPT [id number]        Note: If patient does not return for scheduled/ recommended follow up visits, this note will serve as a discharge from care along with most recent update on progress.

## 2022-09-27 ENCOUNTER — TELEMEDICINE (OUTPATIENT)
Dept: INTERNAL MEDICINE CLINIC | Age: 27
End: 2022-09-27

## 2022-09-27 DIAGNOSIS — F43.22 ADJUSTMENT DISORDER WITH ANXIETY: Primary | ICD-10-CM

## 2022-09-27 PROCEDURE — 99212 OFFICE O/P EST SF 10 MIN: CPT | Performed by: INTERNAL MEDICINE

## 2022-09-27 RX ORDER — SERTRALINE HYDROCHLORIDE 100 MG/1
100 TABLET, FILM COATED ORAL DAILY
Qty: 30 TABLET | Refills: 0 | Status: SHIPPED | OUTPATIENT
Start: 2022-09-27 | End: 2022-11-01 | Stop reason: SDUPTHER
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
## 2022-10-04 ENCOUNTER — HOSPITAL ENCOUNTER (OUTPATIENT)
Dept: PHYSICAL THERAPY | Age: 27
Setting detail: THERAPIES SERIES
Discharge: HOME | End: 2022-10-04

## 2022-10-04 PROCEDURE — 20560 NDL INSJ W/O NJX 1 OR 2 MUSC: CPT | Performed by: PHYSICAL THERAPIST

## 2022-10-04 PROCEDURE — 97140 MANUAL THERAPY 1/> REGIONS: CPT | Performed by: PHYSICAL THERAPIST

## 2022-10-04 PROCEDURE — 97110 THERAPEUTIC EXERCISES: CPT | Performed by: PHYSICAL THERAPIST

## 2022-10-04 NOTE — FLOWSHEET NOTE
[facility], [address] [place]  Phone: [phone]  Fax [phone]    Physical Therapy Treatment Note/ Progress Note/Discharge Note:       Date:  10/4/2022    Patient Name:  Kelechi Blandon    :  1995  MRN: [id number]  Restrictions/Precautions:    Medical/Treatment Diagnosis Information:  Diagnosis: Strain of neck muscle (S16.1XXD), Cervical radiculitis (M54.12)  Treatment Diagnosis: Neck pain (M54.2), postural dysfunction (Y73.8)  Insurance/Certification information:  PT Insurance Information: Medical [place]  Physician Information:   LUCRECIA Yang  Has the plan of care been signed (Y/N):        [x]  Yes  []  No     Date of Patient follow up with Physician:     Is this a Progress Report:     [x]  Yes (see above)  []  No        If Yes:  Date Range for reporting period:  Beginnin22  Ending: 10/4/22    Progress report will be due (10 Rx or 30 days whichever is less):       Recertification will be due (POC Duration  / 90 days whichever is less):  10/13/22       Visit # Insurance Allowable Auth Required   In-person 15 (Neck) NO INS - SELF PAY []  Yes [x]  No      Functional Scale: FOTO neck 82/100 (82 %)    Date assessed:  10/4/22     Therapy Diagnosis/Practice Pattern: F      Number of Comorbidities:  []0     [x]1-2    []3+     Latex Allergy:  []NO      []YES  Preferred Language for Healthcare:   [x]English       []other:    Pain level:  0-1/10     SUBJECTIVE:  Patient reports that he is doing well. Minimal to no pain in his neck and only occasional radiating pain into his arm that resolves with exercises. Feels mild tightness in his traps but overall happy with where he is at. OBJECTIVE: .    Observation/Palpation: general TrP noted in levator scap proximal upper trap  Test measurements: AROM right cervical rotation 60 deg, left rotation 65 deg, flexion and ext WNL    RESTRICTIONS/PRECAUTIONS: Exercises/Interventions:   Consent signed 7/28/2022 and precautions addressed. See media tab. Muscle  Needle Size Technique Notes IES   Site 1 Right upper trap x3 .3x40mm [] Pistoning / [x]  Threading  []  Winding/Coning Multiple large LTRs []    Site 2 Left upper trap x 2 .3x40mm [] Pistoning / [x]  Threading  []  Winding/Coning Multiple large LTRs []    Site 3 [] Pistoning / []  Threading  [x]  Winding/Coning  []    Site 4   [] Pistoning / []  Threading  []  Winding/Coning  []    Site 5   [] Pistoning / []  Threading  []  Winding/Coning  []    Site 6   [] Pistoning / []  Threading  []  Winding/Coning  []    Site 7   [] Pistoning / []  Threading  []  Winding/Coning  []    Site 8   [] Pistoning / []  Threading  []  Winding/Coning  []    Site 9   [] Pistoning / []  Threading  []  Winding/Coning  []    Site 10   [] Pistoning / []  Threading  []  Winding/Coning  []      **The above techniques were used to restore functional range of motion, reduce muscle spasm, and induce healing in the corresponding musculature by means of intramuscular mobilization. Clean Technique was utilized today while applying the Dry needling treatment. The treatment sites where cleaned with 70% solution of isopropyl alcohol. The PT washed their hands and utilized treatment gloves along with hand  prior to inserting the needles. All needles where removed and discarded in the appropriate sharps container. MD has given verbal and/or written approval for this treatment. **          ** Educated patient on anatomy, trigger point etiology, expectations for TDN (bruising, soreness, etc), outcomes, and recommendations for exercise.  **    Therapeutic Ex        Sets/sec Reps Notes   Feet staggered   With chintuck       GTB  #15                            Patient ed   Focusing on postur e and                      Manual Intervention           Palpation and assessment of TPs and taut bands for TDN 4'           Prone PAs at [facility] 4' Supine A to P thrust mid T spine 2' Multiple Cavitations noted   Cervical side glides 5'        NMR re-education             Supine chin tucks with lift 1 10x Needs cueing   Pain looking to left             alternating   Needs cueing   Needs cueing   TDN                See above 2'       HEP instruction:   Access Code: [email or web address]  URL: [email or web address]  Date: 06/28/2022  Prepared by: Tiffanie Cormier      Therapeutic Exercise and NMR EXR  [x] (32326) Provided verbal/tactile cueing for activities related to strengthening, flexibility, endurance, ROM  for improvements in cervical, postural, scapular, scapulothoracic and UE control with self care, reaching, carrying, lifting, house/yardwork, driving/computer work. [x] (08899) Provided verbal/tactile cueing for activities related to improving balance, coordination, kinesthetic sense, posture, motor skill, proprioception  to assist with cervical, scapular, scapulothoracic and UE control with self care, reaching, carrying, lifting, house/yardwork, driving/computer work. Therapeutic Activities:    [] (10181 or 29520) Provided verbal/tactile cueing for activities related to improving balance, coordination, kinesthetic sense, posture, motor skill, proprioception and motor activation to allow for proper function of cervical, scapular, scapulothoracic and UE control with self care, carrying, lifting, driving/computer work.      Home Exercise Program:    [x] (51866) Reviewed/Progressed HEP activities related to strengthening, flexibility, endurance, ROM of cervical, scapular, scapulothoracic and UE control with self care, reaching, carrying, lifting, house/yardwork, driving/computer work  [] (56048) Reviewed/Progressed HEP activities related to improving balance, coordination, kinesthetic sense, posture, motor skill, proprioception of cervical, scapular, scapulothoracic and UE control with self care, reaching, carrying, lifting, house/yardwork, driving/computer work Manual Treatments:  PROM / STM / Oscillations-Mobs:  G-I, II, III, IV (PA's, Inf., Post.)  [x] (44667) Provided manual therapy to mobilize soft tissue/joints of cervical/CT, scapular GHJ and UE for the purpose of decreasing headache, modulating pain, promoting relaxation,  increasing ROM, reducing/eliminating soft tissue swelling/inflammation/restriction, improving soft tissue extensibility and allowing for proper ROM for normal function with self care, reaching, carrying, lifting, house/yardwork, driving/computer work    Trigger point Dry Needling:  fine needle insertion into myofascial trigger points to stimulate a healing response  [x] (20733) Needle insertion without injection: 1 or 2 muscles  [] (99698) Needle insertion without injection: 3 or more muscles    Modalities:  Declined   Charges  Timed Code Treatment Minutes: 20'   Total Treatment Minutes: 22'     [] EVAL (LOW) 53456   [] EVAL (MOD) 44217   [] EVAL (HIGH) 15800   [] RE-EVAL     [] WH(07838) x     [] IONTO  [] NMR (81797) x   [] VASO  [x] Manual (37235) x 1  [x] TDN needle insertion (1-2)  [] TA x      [] Mech Traction (83941)  [] ES(attended) (42988)      [] ES (un) (05100):     GOALS:  Patient stated goal: \"to decrease pain and get back to more activities\"  [] Progressing: [] Met: [] Not Met: [] Adjusted    Therapist goals for Patient:   Short Term Goals: To be achieved in: 2 weeks  1. Independent in HEP and progression per patient tolerance, in order to prevent re-injury. [] Progressing: [x] Met: [] Not Met: [] Adjusted  2. Patient will have a decrease in pain to facilitate improvement in movement, function, and ADLs as indicated by Functional Deficits. [] Progressing: [x] Met: [] Not Met: [] Adjusted    Long Term Goals: To be achieved in: 1x/wk for 4-6 weeks (10/13/22)  1. Disability index score of 61% or more for the FOTO Neck to assist with reaching prior level of function. [] Progressing: [x] Met: [] Not Met: [] Adjusted  2.  Patient will demonstrate increased AROM to of cervical rotation to 50 deg B and thoracic mobility to [facility] to allow for proper joint functioning as indicated by patients Functional Deficits. [] Progressing: [x] Met: [] Not Met: [] Adjusted  3. Patient will demonstrate an increase in postural awareness and control and activation of  Deep cervical stabilizers to allow for proper functional mobility as indicated by patients Functional Deficits. [] Progressing: [x] Met: [] Not Met: [] Adjusted   4. Patient will be able to sleep without symptoms or restriction. [] Progressing: [x] Met: [] Not Met: [] Adjusted  5. Patient will be able to drive without increased symptoms or restrictions. (patient specific functional goal) [] Progressing: [x] Met: [] Not Met: [] Adjusted     Overall Progression Towards Functional goals/ Treatment Progress Update:  [x] Patient is progressing as expected towards functional goals listed. [] Progression is slowed due to complexities/Impairments listed. [] Progression has been slowed due to co-morbidities. [] Plan just implemented, too soon to assess goals progression <30days   [] Goals require adjustment due to lack of progress  [] Patient is not progressing as expected and requires additional follow up with physician  [] Other    Prognosis for POC: [x] Good [] Fair  [] Poor      Patient requires continued skilled intervention: [x] Yes  [] No      ASSESSMENT:  Patient doing well and has met all goals set at evaluation. Educated patient on posture and continuing to work on [employer] moving forward. Will DC with HEP.     Treatment/Activity Tolerance:  [x] Patient tolerated treatment well [] Patient limited by fatique  [] Patient limited by pain  [] Patient limited by other medical complications  [] Other:     Prognosis: [] Good [x] Fair  [] Poor    Patient Requires Follow-up: [x] Yes  [] No    PLAN: See eval  [] Continue per plan of care [] Alter current plan (see comments above)  [] Plan of care initiated [] Hold pending MD visit [x] Discharge      Electronically signed by:  Debbie Frost PT, DPT [id number]        Note: If patient does not return for scheduled/ recommended follow up visits, this note will serve as a discharge from care along with most recent update on progress.

## 2022-10-31 ENCOUNTER — TELEPHONE (OUTPATIENT)
Dept: INTERNAL MEDICINE CLINIC | Age: 27
End: 2022-10-31

## 2022-10-31 NOTE — TELEPHONE ENCOUNTER
Received request from [place], [place] - faxed to [facility] for processing on 10/31/2022. For status update, call [phone] option 1.

## 2022-11-01 ENCOUNTER — TELEMEDICINE (OUTPATIENT)
Dept: INTERNAL MEDICINE CLINIC | Age: 27
End: 2022-11-01
Payer: COMMERCIAL

## 2022-11-01 DIAGNOSIS — F43.22 ADJUSTMENT DISORDER WITH ANXIETY: ICD-10-CM

## 2022-11-01 PROCEDURE — 99213 OFFICE O/P EST LOW 20 MIN: CPT | Performed by: INTERNAL MEDICINE

## 2022-11-01 RX ORDER — SERTRALINE HYDROCHLORIDE 100 MG/1
100 TABLET, FILM COATED ORAL DAILY
Qty: 90 TABLET | Refills: 0 | Status: SHIPPED | OUTPATIENT
Start: 2022-11-01

## 2022-12-19 NOTE — FLOWSHEET NOTE
[facility], [address]  Phone: [phone]  Fax [phone]      Physical Therapy Treatment Note/ Progress Report:       Date:  2022    Patient Name:  Dru Contreras    :  1995  MRN: [id number]  Restrictions/Precautions:    Medical/Treatment Diagnosis Information:  · Diagnosis: Strain of neck muscle (S16.1XXD), Cervical radiculitis (M54.12)  Treatment Diagnosis: Neck pain (M54.2), postural dysfunction (V28.7)  Insurance/Certification information:  PT Insurance Information: Medical [place]  Physician Information:   LUCRECIA Gresham  Has the plan of care been signed (Y/N):        []  Yes  [x]  No     Date of Patient follow up with Physician:     Is this a Progress Report:     []  Yes  [x]  No        If Yes:  Date Range for reporting period:  Beginnin22  Ending    Progress report will be due (10 Rx or 30 days whichever is less):        Recertification will be due (POC Duration  / 90 days whichever is less):         Visit # Insurance Allowable Auth Required   In-person 1  []  Yes []  No    Telehealth   []  Yes []  No    Total        Functional Scale: FOTO neck 45/100 (45 %)    Date assessed:  22   Therapy Diagnosis/Practice Pattern: F      Number of Comorbidities:  []0     [x]1-2    []3+     Latex Allergy:  [x]NO      []YES  Preferred Language for Healthcare:   [x]English       []other:    Pain level:  4-8/10     SUBJECTIVE:  See eval    OBJECTIVE: See eval   Observation:    Test measurements:      RESTRICTIONS/PRECAUTIONS:     Exercises/Interventions:   Therapeutic Ex        Sets/sec Reps Notes   Standing UT stretch  15\" 3x ea B gentle   Doorway pec stretch 15\" 4x Feet staggered   sidelying open book stretch 5\" 10x ea R/L    scap squeezes 5\" 15x    No $ 3\" 15x                Patient ed   HEP, POC, ice vs heat, posture                                             Manual Intervention           STM bilateral upper traps and pec supine, lower cervical side glides, gentle manual traction 10'                                         NMR re-education                Chin tucks supine 5\" 10x                      Traction                                 HEP instruction:   Access Code: [email or web address]  URL: [email or web address]  Date: 06/28/2022  Prepared by: Kathleen Shaw      Therapeutic Exercise and NMR EXR  [x] (15488) Provided verbal/tactile cueing for activities related to strengthening, flexibility, endurance, ROM  for improvements in cervical, postural, scapular, scapulothoracic and UE control with self care, reaching, carrying, lifting, house/yardwork, driving/computer work. [x] (18088) Provided verbal/tactile cueing for activities related to improving balance, coordination, kinesthetic sense, posture, motor skill, proprioception  to assist with cervical, scapular, scapulothoracic and UE control with self care, reaching, carrying, lifting, house/yardwork, driving/computer work. Therapeutic Activities:    [] (88207 or 92898) Provided verbal/tactile cueing for activities related to improving balance, coordination, kinesthetic sense, posture, motor skill, proprioception and motor activation to allow for proper function of cervical, scapular, scapulothoracic and UE control with self care, carrying, lifting, driving/computer work.      Home Exercise Program:    [x] (26298) Reviewed/Progressed HEP activities related to strengthening, flexibility, endurance, ROM of cervical, scapular, scapulothoracic and UE control with self care, reaching, carrying, lifting, house/yardwork, driving/computer work  [] (97456) Reviewed/Progressed HEP activities related to improving balance, coordination, kinesthetic sense, posture, motor skill, proprioception of cervical, scapular, scapulothoracic and UE control with self care, reaching, carrying, lifting, house/yardwork, driving/computer work      Manual Treatments:  PROM / STM / Oscillations-Mobs:  G-I, II, III, IV (PA's, Inf., Post.)  [x] (09171) Provided manual therapy to mobilize soft tissue/joints of cervical/CT, scapular GHJ and UE for the purpose of decreasing headache, modulating pain, promoting relaxation,  increasing ROM, reducing/eliminating soft tissue swelling/inflammation/restriction, improving soft tissue extensibility and allowing for proper ROM for normal function with self care, reaching, carrying, lifting, house/yardwork, driving/computer work    Modalities:  Declined   Charges  Timed Code Treatment Minutes: 24'   Total Treatment Minutes: 50'     [x] EVAL (LOW) 95557   [] EVAL (MOD) 86961   [] EVAL (HIGH) 83617   [] RE-EVAL     [x] GO(72258) x 1    [] IONTO  [] NMR (84829) x     [] VASO  [x] Manual (71240) x 1    [] Other:  [] TA x      [] Mech Traction (91235)  [] ES(attended) (14143)      [] ES (un) (05536):     GOALS:  Patient stated goal: \"to decrease pain and get back to more activities\"  [] Progressing: [] Met: [] Not Met: [] Adjusted    Therapist goals for Patient:   Short Term Goals: To be achieved in: 2 weeks  1. Independent in HEP and progression per patient tolerance, in order to prevent re-injury. [] Progressing: [] Met: [] Not Met: [] Adjusted  2. Patient will have a decrease in pain to facilitate improvement in movement, function, and ADLs as indicated by Functional Deficits. [] Progressing: [] Met: [] Not Met: [] Adjusted    Long Term Goals: To be achieved in: 8 weeks  1. Disability index score of 61% or more for the FOTO Neck to assist with reaching prior level of function. [] Progressing: [] Met: [] Not Met: [] Adjusted  2. Patient will demonstrate increased AROM to of cervical rotation to 50 deg B and thoracic mobility to [facility] to allow for proper joint functioning as indicated by patients Functional Deficits. [] Progressing: [] Met: [] Not Met: [] Adjusted  3.  Patient will demonstrate an increase in postural awareness and control and activation of  Deep cervical stabilizers to allow for proper functional mobility as indicated by patients Functional Deficits. [] Progressing: [] Met: [] Not Met: [] Adjusted   4. Patient will be able to sleep without symptoms or restriction. [] Progressing: [] Met: [] Not Met: [] Adjusted  5. Patient will be able to drive without increased symptoms or restrictions. (patient specific functional goal) [] Progressing: [] Met: [] Not Met: [] Adjusted     Overall Progression Towards Functional goals/ Treatment Progress Update:  [] Patient is progressing as expected towards functional goals listed. [] Progression is slowed due to complexities/Impairments listed. [] Progression has been slowed due to co-morbidities. [x] Plan just implemented, too soon to assess goals progression <30days   [] Goals require adjustment due to lack of progress  [] Patient is not progressing as expected and requires additional follow up with physician  [] Other    Prognosis for POC: [x] Good [] Fair  [] Poor      Patient requires continued skilled intervention: [x] Yes  [] No      ASSESSMENT:  See eval    Treatment/Activity Tolerance:  [x] Patient tolerated treatment well [] Patient limited by fatique  [] Patient limited by pain  [] Patient limited by other medical complications  [] Other:     Prognosis: [] Good [x] Fair  [] Poor    Patient Requires Follow-up: [x] Yes  [] No    PLAN: See eval  [] Continue per plan of care [] Alter current plan (see comments above)  [x] Plan of care initiated [] Hold pending MD visit [] Discharge      Electronically signed by:  Jaquan Melchor, PT, DPT [id number]   Dar Murillo [facility]  Therapist was present, directed the patient's care, made skilled judgement, and was responsible for assessment and treatment of the patient. Note: If patient does not return for scheduled/ recommended follow up visits, this note will serve as a discharge from care along with most recent update on progress. dental pain/injury

## 2023-02-23 ENCOUNTER — OFFICE VISIT (OUTPATIENT)
Dept: INTERNAL MEDICINE CLINIC | Age: 28
End: 2023-02-23

## 2023-02-23 VITALS
DIASTOLIC BLOOD PRESSURE: 66 MMHG | WEIGHT: 225 LBS | HEART RATE: 66 BPM | HEIGHT: 74 IN | BODY MASS INDEX: 28.88 KG/M2 | SYSTOLIC BLOOD PRESSURE: 108 MMHG | OXYGEN SATURATION: 98 %

## 2023-02-23 DIAGNOSIS — F43.22 ADJUSTMENT DISORDER WITH ANXIETY: ICD-10-CM

## 2023-02-23 DIAGNOSIS — Z00.00 ENCOUNTER FOR WELL ADULT EXAM WITHOUT ABNORMAL FINDINGS: Primary | ICD-10-CM

## 2023-02-23 RX ORDER — SERTRALINE HYDROCHLORIDE 100 MG/1
100 TABLET, FILM COATED ORAL DAILY
Qty: 90 TABLET | Refills: 1 | Status: SHIPPED | OUTPATIENT
Start: 2023-02-23

## 2023-02-23 SDOH — ECONOMIC STABILITY: FOOD INSECURITY: WITHIN THE PAST 12 MONTHS, YOU WORRIED THAT YOUR FOOD WOULD RUN OUT BEFORE YOU GOT MONEY TO BUY MORE.: NEVER TRUE

## 2023-02-23 SDOH — ECONOMIC STABILITY: HOUSING INSECURITY
IN THE LAST 12 MONTHS, WAS THERE A TIME WHEN YOU DID NOT HAVE A STEADY PLACE TO SLEEP OR SLEPT IN A SHELTER (INCLUDING NOW)?: NO

## 2023-02-23 SDOH — ECONOMIC STABILITY: FOOD INSECURITY: WITHIN THE PAST 12 MONTHS, THE FOOD YOU BOUGHT JUST DIDN'T LAST AND YOU DIDN'T HAVE MONEY TO GET MORE.: NEVER TRUE

## 2023-02-23 SDOH — ECONOMIC STABILITY: INCOME INSECURITY: HOW HARD IS IT FOR YOU TO PAY FOR THE VERY BASICS LIKE FOOD, HOUSING, MEDICAL CARE, AND HEATING?: NOT HARD AT ALL

## 2023-02-23 ASSESSMENT — PATIENT HEALTH QUESTIONNAIRE - PHQ9
SUM OF ALL RESPONSES TO PHQ QUESTIONS 1-9: 1
SUM OF ALL RESPONSES TO PHQ9 QUESTIONS 1 & 2: 0
7. TROUBLE CONCENTRATING ON THINGS, SUCH AS READING THE NEWSPAPER OR WATCHING TELEVISION: 0
10. IF YOU CHECKED OFF ANY PROBLEMS, HOW DIFFICULT HAVE THESE PROBLEMS MADE IT FOR YOU TO DO YOUR WORK, TAKE CARE OF THINGS AT HOME, OR GET ALONG WITH OTHER PEOPLE: 0
8. MOVING OR SPEAKING SO SLOWLY THAT OTHER PEOPLE COULD HAVE NOTICED. OR THE OPPOSITE, BEING SO FIGETY OR RESTLESS THAT YOU HAVE BEEN MOVING AROUND A LOT MORE THAN USUAL: 0
6. FEELING BAD ABOUT YOURSELF - OR THAT YOU ARE A FAILURE OR HAVE LET YOURSELF OR YOUR FAMILY DOWN: 0
SUM OF ALL RESPONSES TO PHQ QUESTIONS 1-9: 1
9. THOUGHTS THAT YOU WOULD BE BETTER OFF DEAD, OR OF HURTING YOURSELF: 0
4. FEELING TIRED OR HAVING LITTLE ENERGY: 0
5. POOR APPETITE OR OVEREATING: 0
SUM OF ALL RESPONSES TO PHQ QUESTIONS 1-9: 1
1. LITTLE INTEREST OR PLEASURE IN DOING THINGS: 0
3. TROUBLE FALLING OR STAYING ASLEEP: 1
2. FEELING DOWN, DEPRESSED OR HOPELESS: 0
SUM OF ALL RESPONSES TO PHQ QUESTIONS 1-9: 1
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
## 2023-02-23 NOTE — PATIENT INSTRUCTIONS
Well Visit, Ages 25 to 72: Care Instructions  Well visits can help you stay healthy. Your doctor has checked your overall health and may have suggested ways to take good care of yourself. Your doctor also may have recommended tests. You can help prevent illness with healthy eating, good sleep, vaccinations, regular exercise, and other steps. Get the tests that you and your doctor decide on. Depending on your age and risks, examples might include screening for diabetes; hepatitis C; HIV; and cervical, breast, lung, and colon cancer. Screening helps find diseases before any symptoms appear. Eat healthy foods. Choose fruits, vegetables, whole grains, lean protein, and low-fat dairy foods. Limit saturated fat and reduce salt. Limit alcohol. Men should have no more than 2 drinks a day. Women should have no more than 1. For some people, no alcohol is the best choice. Exercise. Get at least 30 minutes of exercise on most days of the week. Walking can be a good choice. Reach and stay at your healthy weight. This will lower your risk for many health problems. Take care of your mental health. Try to stay connected with friends, family, and community, and find ways to manage stress. If you're feeling depressed or hopeless, talk to someone. A counselor can help. If you don't have a counselor, talk to your doctor. Talk to your doctor if you think you may have a problem with alcohol or drug use. This includes prescription medicines and illegal drugs. Avoid tobacco and nicotine: Don't smoke, vape, or chew. If you need help quitting, talk to your doctor. Practice safer sex. Getting tested, using condoms or dental dams, and limiting sex partners can help prevent STIs. Use birth control if it's important to you to prevent pregnancy. Talk with your doctor about your choices and what might be best for you. Prevent problems where you can.  Protect your skin from too much sun, wash your hands, brush your teeth twice a day, and wear a seat belt in the car. Where can you learn more? Go to http://www.washington.com/ and enter P072 to learn more about \"Well Visit, Ages 25 to 72: Care Instructions. \"  Current as of: March 9, 2022               Content Version: 13.5  © 6384-7048 Healthwise, Incorporated. Care instructions adapted under license by [facility] ([facility]). If you have questions about a medical condition or this instruction, always ask your healthcare professional. [address] any warranty or liability for your use of this information.

## 2023-02-24 LAB
A/G RATIO: 2 (ref 1.1–2.2)
ALBUMIN SERPL-MCNC: 4.6 G/DL (ref 3.4–5)
ALP BLD-CCNC: 60 U/L (ref 40–129)
ALT SERPL-CCNC: 26 U/L (ref 10–40)
ANION GAP SERPL CALCULATED.3IONS-SCNC: 15 MMOL/L (ref 3–16)
AST SERPL-CCNC: 27 U/L (ref 15–37)
BASOPHILS ABSOLUTE: 0.1 K/UL (ref 0–0.2)
BASOPHILS RELATIVE PERCENT: 0.8 %
BILIRUB SERPL-MCNC: 0.6 MG/DL (ref 0–1)
BUN BLDV-MCNC: 14 MG/DL (ref 7–20)
CALCIUM SERPL-MCNC: 9.6 MG/DL (ref 8.3–10.6)
CHLORIDE BLD-SCNC: 99 MMOL/L (ref 99–110)
CHOLESTEROL, TOTAL: 210 MG/DL (ref 0–199)
CO2: 23 MMOL/L (ref 21–32)
CREAT SERPL-MCNC: 0.9 MG/DL (ref 0.9–1.3)
EOSINOPHILS ABSOLUTE: 0.4 K/UL (ref 0–0.6)
EOSINOPHILS RELATIVE PERCENT: 5.9 %
GFR SERPL CREATININE-BSD FRML MDRD: >60 ML/MIN/{1.73_M2}
GLUCOSE BLD-MCNC: 82 MG/DL (ref 70–99)
HCT VFR BLD CALC: 42.1 % (ref 40.5–52.5)
HDLC SERPL-MCNC: 56 MG/DL (ref 40–60)
HEMOGLOBIN: 14.6 G/DL (ref 13.5–17.5)
LDL CHOLESTEROL CALCULATED: 138 MG/DL
LYMPHOCYTES ABSOLUTE: 1.7 K/UL (ref 1–5.1)
LYMPHOCYTES RELATIVE PERCENT: 27 %
MCH RBC QN AUTO: 31.7 PG (ref 26–34)
MCHC RBC AUTO-ENTMCNC: 34.7 G/DL (ref 31–36)
MCV RBC AUTO: 91.4 FL (ref 80–100)
MONOCYTES ABSOLUTE: 0.4 K/UL (ref 0–1.3)
MONOCYTES RELATIVE PERCENT: 6.1 %
NEUTROPHILS ABSOLUTE: 3.9 K/UL (ref 1.7–7.7)
NEUTROPHILS RELATIVE PERCENT: 60.2 %
PDW BLD-RTO: 13.3 % (ref 12.4–15.4)
PLATELET # BLD: 240 K/UL (ref 135–450)
PMV BLD AUTO: 8.3 FL (ref 5–10.5)
POTASSIUM SERPL-SCNC: 3.9 MMOL/L (ref 3.5–5.1)
RBC # BLD: 4.6 M/UL (ref 4.2–5.9)
SODIUM BLD-SCNC: 137 MMOL/L (ref 136–145)
TOTAL PROTEIN: 6.9 G/DL (ref 6.4–8.2)
TRIGL SERPL-MCNC: 80 MG/DL (ref 0–150)
VLDLC SERPL CALC-MCNC: 16 MG/DL
WBC # BLD: 6.4 K/UL (ref 4–11)

## 2023-08-22 ENCOUNTER — TELEMEDICINE (OUTPATIENT)
Dept: INTERNAL MEDICINE CLINIC | Age: 28
End: 2023-08-22
Payer: COMMERCIAL

## 2023-08-22 DIAGNOSIS — F43.22 ADJUSTMENT DISORDER WITH ANXIETY: ICD-10-CM

## 2023-08-22 PROCEDURE — 99213 OFFICE O/P EST LOW 20 MIN: CPT | Performed by: INTERNAL MEDICINE

## 2023-08-22 RX ORDER — SERTRALINE HYDROCHLORIDE 100 MG/1
150 TABLET, FILM COATED ORAL DAILY
Qty: 45 TABLET | Refills: 0 | Status: SHIPPED | OUTPATIENT
Start: 2023-08-22

## 2023-08-22 NOTE — PROGRESS NOTES
PSA   No results found for: [facility]     Patient Active Problem List   Diagnosis   (none) - all problems resolved or deleted       No Known Allergies  Outpatient Medications Marked as Taking for the 8/22/23 encounter (Telemedicine) with Maria Antonia Santillan MD   Medication Sig Dispense Refill    sertraline (ZOLOFT) 100 MG tablet Take 1 tablet by mouth daily 90 tablet 1         Review of Systems: 14 systems were negative except of what was stated on HPI    Nursing note and vitals reviewed. There were no vitals filed for this visit. Wt Readings from Last 3 Encounters:   04/12/23 227 lb (103 kg)   02/23/23 225 lb (102.1 kg)   07/11/22 196 lb (88.9 kg)     BP Readings from Last 3 Encounters:   04/12/23 124/66   02/23/23 108/66   05/12/22 99/78     There is no height or weight on file to calculate BMI. Constitutional: Patient appears well-developed and well-nourished. No distress. Head: Normocephalic and atraumatic. Psychiatric: Normal mood and affect.  Behavior is normal.

## 2023-09-19 ENCOUNTER — TELEMEDICINE (OUTPATIENT)
Dept: INTERNAL MEDICINE CLINIC | Age: 28
End: 2023-09-19
Payer: COMMERCIAL

## 2023-09-19 DIAGNOSIS — F43.22 ADJUSTMENT DISORDER WITH ANXIETY: ICD-10-CM

## 2023-09-19 PROCEDURE — 99213 OFFICE O/P EST LOW 20 MIN: CPT | Performed by: INTERNAL MEDICINE

## 2023-09-19 RX ORDER — SERTRALINE HYDROCHLORIDE 100 MG/1
150 TABLET, FILM COATED ORAL DAILY
Qty: 135 TABLET | Refills: 0 | Status: SHIPPED | OUTPATIENT
Start: 2023-09-19

## 2023-09-19 NOTE — PROGRESS NOTES
Patient was evaluated through a synchronous (real-time) video encounter. Patient identification was verified at the start of the visit. She (or guardian if applicable) is aware that this is a billable service, which includes applicable co-pays. This visit was conducted with the patient's (and/or legal guardian's) verbal consent. She has not had a related appointment within my department in the past 7 days or scheduled within the next 24 hours. The patient was located at Home: [address] [address]. The provider was located at [facility] ([address] ): [address] [place] [address], [address] [facility]. Date of Service:  9/19/2023    Chief Complaint:      Chief Complaint   Patient presents with    Anxiety       Assessment/Plan:    Nhung Peterson was seen today for anxiety. Diagnoses and all orders for this visit:    Adjustment disorder with anxiety  -     sertraline (ZOLOFT) 100 MG tablet; Take 1.5 tablets by mouth daily    Stable and continue on current medications. Return VV Anxiety 12/19. HPI:  Qi Godoy is a 33 y.o. Anxiety improved from almost daily to feeling overwhelmed 2 times a month lasting mins on Zoloft 100 mg 1.5 AM.  Panic attack is only every few months when there's an influx of customers.     No results found for: \"LABA1C\"  Lab Results   Component Value Date     02/23/2023    K 3.9 02/23/2023    CL 99 02/23/2023    CO2 23 02/23/2023    BUN 14 02/23/2023    CREATININE 0.9 02/23/2023    GLUCOSE 82 02/23/2023    CALCIUM 9.6 02/23/2023     Lab Results   Component Value Date/Time    CHOL 210 02/23/2023 02:48 PM    TRIG 80 02/23/2023 02:48 PM    HDL 56 02/23/2023 02:48 PM    LDLCALC 138 02/23/2023 02:48 PM     Lab Results   Component Value Date    ALT 26 02/23/2023    AST 27 02/23/2023     No results found for: \"TSH\", \"T4FREE\", \"T3FREE\"  Lab Results   Component Value Date    WBC 6.4 02/23/2023    HGB 14.6 02/23/2023    HCT 42.1 02/23/2023    MCV 91.4 02/23/2023

## 2023-12-19 PROBLEM — F43.22 ADJUSTMENT DISORDER WITH ANXIETY: Status: ACTIVE | Noted: 2023-12-19

## 2024-03-14 ENCOUNTER — OFFICE VISIT (OUTPATIENT)
Dept: INTERNAL MEDICINE CLINIC | Age: 29
End: 2024-03-14
Payer: COMMERCIAL

## 2024-03-14 VITALS
OXYGEN SATURATION: 99 % | HEIGHT: 74 IN | DIASTOLIC BLOOD PRESSURE: 72 MMHG | HEART RATE: 72 BPM | SYSTOLIC BLOOD PRESSURE: 108 MMHG | WEIGHT: 234 LBS | BODY MASS INDEX: 30.03 KG/M2

## 2024-03-14 DIAGNOSIS — Z23 NEED FOR HEPATITIS B VACCINATION: ICD-10-CM

## 2024-03-14 DIAGNOSIS — Z00.00 ENCOUNTER FOR WELL ADULT EXAM WITHOUT ABNORMAL FINDINGS: Primary | ICD-10-CM

## 2024-03-14 DIAGNOSIS — F43.22 ADJUSTMENT DISORDER WITH ANXIETY: ICD-10-CM

## 2024-03-14 DIAGNOSIS — Z71.89 ACP (ADVANCE CARE PLANNING): ICD-10-CM

## 2024-03-14 LAB
BASOPHILS # BLD: 0.1 K/UL (ref 0–0.2)
BASOPHILS NFR BLD: 1.1 %
DEPRECATED RDW RBC AUTO: 13.4 % (ref 12.4–15.4)
EOSINOPHIL # BLD: 0.3 K/UL (ref 0–0.6)
EOSINOPHIL NFR BLD: 5.4 %
HCT VFR BLD AUTO: 46.3 % (ref 40.5–52.5)
HGB BLD-MCNC: 16 G/DL (ref 13.5–17.5)
LYMPHOCYTES # BLD: 1.7 K/UL (ref 1–5.1)
LYMPHOCYTES NFR BLD: 33.7 %
MCH RBC QN AUTO: 31.9 PG (ref 26–34)
MCHC RBC AUTO-ENTMCNC: 34.5 G/DL (ref 31–36)
MCV RBC AUTO: 92.6 FL (ref 80–100)
MONOCYTES # BLD: 0.4 K/UL (ref 0–1.3)
MONOCYTES NFR BLD: 7 %
NEUTROPHILS # BLD: 2.7 K/UL (ref 1.7–7.7)
NEUTROPHILS NFR BLD: 52.8 %
PLATELET # BLD AUTO: 223 K/UL (ref 135–450)
PMV BLD AUTO: 8.6 FL (ref 5–10.5)
RBC # BLD AUTO: 5 M/UL (ref 4.2–5.9)
WBC # BLD AUTO: 5.2 K/UL (ref 4–11)

## 2024-03-14 PROCEDURE — 90739 HEPB VACC 2/4 DOSE ADULT IM: CPT | Performed by: INTERNAL MEDICINE

## 2024-03-14 PROCEDURE — 99395 PREV VISIT EST AGE 18-39: CPT | Performed by: INTERNAL MEDICINE

## 2024-03-14 PROCEDURE — 36415 COLL VENOUS BLD VENIPUNCTURE: CPT | Performed by: INTERNAL MEDICINE

## 2024-03-14 PROCEDURE — 90471 IMMUNIZATION ADMIN: CPT | Performed by: INTERNAL MEDICINE

## 2024-03-14 RX ORDER — SERTRALINE HYDROCHLORIDE 100 MG/1
150 TABLET, FILM COATED ORAL DAILY
Qty: 135 TABLET | Refills: 1 | Status: SHIPPED | OUTPATIENT
Start: 2024-03-14

## 2024-03-14 SDOH — ECONOMIC STABILITY: FOOD INSECURITY: WITHIN THE PAST 12 MONTHS, YOU WORRIED THAT YOUR FOOD WOULD RUN OUT BEFORE YOU GOT MONEY TO BUY MORE.: NEVER TRUE

## 2024-03-14 SDOH — ECONOMIC STABILITY: INCOME INSECURITY: HOW HARD IS IT FOR YOU TO PAY FOR THE VERY BASICS LIKE FOOD, HOUSING, MEDICAL CARE, AND HEATING?: NOT HARD AT ALL

## 2024-03-14 SDOH — ECONOMIC STABILITY: FOOD INSECURITY: WITHIN THE PAST 12 MONTHS, THE FOOD YOU BOUGHT JUST DIDN'T LAST AND YOU DIDN'T HAVE MONEY TO GET MORE.: NEVER TRUE

## 2024-03-14 ASSESSMENT — PATIENT HEALTH QUESTIONNAIRE - PHQ9
2. FEELING DOWN, DEPRESSED OR HOPELESS: 0
SUM OF ALL RESPONSES TO PHQ QUESTIONS 1-9: 0
1. LITTLE INTEREST OR PLEASURE IN DOING THINGS: 0
9. THOUGHTS THAT YOU WOULD BE BETTER OFF DEAD, OR OF HURTING YOURSELF: 0
6. FEELING BAD ABOUT YOURSELF - OR THAT YOU ARE A FAILURE OR HAVE LET YOURSELF OR YOUR FAMILY DOWN: 0
5. POOR APPETITE OR OVEREATING: 0
SUM OF ALL RESPONSES TO PHQ QUESTIONS 1-9: 0
SUM OF ALL RESPONSES TO PHQ QUESTIONS 1-9: 0
4. FEELING TIRED OR HAVING LITTLE ENERGY: 0
SUM OF ALL RESPONSES TO PHQ QUESTIONS 1-9: 0
7. TROUBLE CONCENTRATING ON THINGS, SUCH AS READING THE NEWSPAPER OR WATCHING TELEVISION: 0
SUM OF ALL RESPONSES TO PHQ9 QUESTIONS 1 & 2: 0
8. MOVING OR SPEAKING SO SLOWLY THAT OTHER PEOPLE COULD HAVE NOTICED. OR THE OPPOSITE, BEING SO FIGETY OR RESTLESS THAT YOU HAVE BEEN MOVING AROUND A LOT MORE THAN USUAL: 0
10. IF YOU CHECKED OFF ANY PROBLEMS, HOW DIFFICULT HAVE THESE PROBLEMS MADE IT FOR YOU TO DO YOUR WORK, TAKE CARE OF THINGS AT HOME, OR GET ALONG WITH OTHER PEOPLE: 0
3. TROUBLE FALLING OR STAYING ASLEEP: 0

## 2024-03-14 NOTE — PATIENT INSTRUCTIONS
exercise, and other steps.    Get the tests that you and your doctor decide on. Depending on your age and risks, examples might include screening for diabetes; hepatitis C; HIV; and cervical, breast, lung, and colon cancer. Screening helps find diseases before any symptoms appear.   Eat healthy foods. Choose fruits, vegetables, whole grains, lean protein, and low-fat dairy foods. Limit saturated fat and reduce salt.     Limit alcohol. Men should have no more than 2 drinks a day. Women should have no more than 1. For some people, no alcohol is the best choice.   Exercise. Get at least 30 minutes of exercise on most days of the week. Walking can be a good choice.     Reach and stay at your healthy weight. This will lower your risk for many health problems.   Take care of your mental health. Try to stay connected with friends, family, and community, and find ways to manage stress.     If you're feeling depressed or hopeless, talk to someone. A counselor can help. If you don't have a counselor, talk to your doctor.   Talk to your doctor if you think you may have a problem with alcohol or drug use. This includes prescription medicines, marijuana, and other drugs.     Avoid tobacco and nicotine: Don't smoke, vape, or chew. If you need help quitting, talk to your doctor.   Practice safer sex. Getting tested, using condoms or dental dams, and limiting sex partners can help prevent STIs.     Use birth control if it's important to you to prevent pregnancy. Talk with your doctor about your choices and what might be best for you.   Prevent problems where you can. Protect your skin from too much sun, wash your hands, brush your teeth twice a day, and wear a seat belt in the car.   Where can you learn more?  Go to https://www.[employer].com.net/patientEd and enter P072 to learn more about \"Well Visit, Ages 18 to 65: Care Instructions.\"  Current as of: August 6, 2023               Content Version: 14.0  © 1020-7865 Healthwise,

## 2024-03-14 NOTE — PROGRESS NOTES
sounds and intact distal pulses.   Pulmonary/Chest: Effort normal and breath sounds normal. No respiratory distress. He has no wheezes, rhonchi or rales.   Abdominal: Soft, non-tender. Bowel sounds and aorta are normal. There is no organomegaly, mass or bruit.   Musculoskeletal: Normal range of motion. He exhibits no edema.   Neurological: He is alert and oriented to person, place, and time. He has normal reflexes. No cranial nerve deficit. Coordination normal.   Skin: Skin is warm, dry and intact.  No suspicious lesions are noted.    Preventive Care:  Health Maintenance   Topic Date Due    Hepatitis B vaccine (1 of 3 - 3-dose series) Never done    COVID-19 Vaccine (4 - 2023-24 season) 09/01/2023    Depression Monitoring  02/23/2024    Flu vaccine (1) 03/14/2025 (Originally 8/1/2023)    DTaP/Tdap/Td vaccine (2 - Td or Tdap) 07/24/2029    Hepatitis C screen  Completed    HIV screen  Completed    Hepatitis A vaccine  Aged Out    Hib vaccine  Aged Out    HPV vaccine  Aged Out    Polio vaccine  Aged Out    Meningococcal (ACWY) vaccine  Aged Out    Pneumococcal 0-64 years Vaccine  Aged Out    Varicella vaccine  Discontinued    Depression Screen  Discontinued        Recommendations for Preventive Services Due: see orders and patient instructions/AVS.  Advance Care Planning   Discussed the patient’s choices for care and treatment preferences in case of a health event that adversely affects decision-making abilities or is life-limiting. Recommended the patient document care preferences in state-specific advance directives. Also reviewed the process of designating a trusted capable adult as an Agent (or Health Care Power of ) to make health care decisions for the patient if the patient becomes unable due to incapacity. Patient was asked to complete advance directive forms, if not already done, and to provide a dated, signed and witnessed (or notarized) copy, per the forms' requirements, to the practice

## 2024-03-15 LAB
ALBUMIN SERPL-MCNC: 5.1 G/DL (ref 3.4–5)
ALBUMIN/GLOB SERPL: 2 {RATIO} (ref 1.1–2.2)
ALP SERPL-CCNC: 64 U/L (ref 40–129)
ALT SERPL-CCNC: 21 U/L (ref 10–40)
ANION GAP SERPL CALCULATED.3IONS-SCNC: 14 MMOL/L (ref 3–16)
AST SERPL-CCNC: 25 U/L (ref 15–37)
BILIRUB SERPL-MCNC: 0.3 MG/DL (ref 0–1)
BUN SERPL-MCNC: 17 MG/DL (ref 7–20)
CALCIUM SERPL-MCNC: 10.3 MG/DL (ref 8.3–10.6)
CHLORIDE SERPL-SCNC: 104 MMOL/L (ref 99–110)
CHOLEST SERPL-MCNC: 239 MG/DL (ref 0–199)
CO2 SERPL-SCNC: 24 MMOL/L (ref 21–32)
CREAT SERPL-MCNC: 1 MG/DL (ref 0.9–1.3)
GFR SERPLBLD CREATININE-BSD FMLA CKD-EPI: >60 ML/MIN/{1.73_M2}
GLUCOSE SERPL-MCNC: 95 MG/DL (ref 70–99)
HDLC SERPL-MCNC: 54 MG/DL (ref 40–60)
LDLC SERPL CALC-MCNC: 171 MG/DL
POTASSIUM SERPL-SCNC: 4.8 MMOL/L (ref 3.5–5.1)
PROT SERPL-MCNC: 7.6 G/DL (ref 6.4–8.2)
SODIUM SERPL-SCNC: 142 MMOL/L (ref 136–145)
TRIGL SERPL-MCNC: 69 MG/DL (ref 0–150)
VLDLC SERPL CALC-MCNC: 14 MG/DL

## 2024-04-18 ENCOUNTER — NURSE ONLY (OUTPATIENT)
Dept: INTERNAL MEDICINE CLINIC | Age: 29
End: 2024-04-18
Payer: COMMERCIAL

## 2024-04-18 DIAGNOSIS — Z23 NEED FOR HEPATITIS B VACCINATION: ICD-10-CM

## 2024-04-18 PROCEDURE — 90471 IMMUNIZATION ADMIN: CPT | Performed by: INTERNAL MEDICINE

## 2024-04-18 PROCEDURE — 90739 HEPB VACC 2/4 DOSE ADULT IM: CPT | Performed by: INTERNAL MEDICINE

## 2024-11-20 ENCOUNTER — OFFICE VISIT (OUTPATIENT)
Dept: INTERNAL MEDICINE CLINIC | Age: 29
End: 2024-11-20

## 2024-11-20 VITALS
HEIGHT: 74 IN | SYSTOLIC BLOOD PRESSURE: 98 MMHG | HEART RATE: 71 BPM | OXYGEN SATURATION: 98 % | WEIGHT: 213 LBS | DIASTOLIC BLOOD PRESSURE: 60 MMHG | BODY MASS INDEX: 27.34 KG/M2

## 2024-11-20 DIAGNOSIS — R51.9 HEADACHE DISORDER: Primary | ICD-10-CM

## 2024-11-20 RX ORDER — AMITRIPTYLINE HYDROCHLORIDE 10 MG/1
TABLET ORAL
Qty: 60 TABLET | Refills: 0 | Status: SHIPPED | OUTPATIENT
Start: 2024-11-20

## 2024-11-20 RX ORDER — MELOXICAM 15 MG/1
15 TABLET ORAL DAILY
Qty: 30 TABLET | Refills: 0 | Status: SHIPPED | OUTPATIENT
Start: 2024-11-20

## 2024-11-20 NOTE — PROGRESS NOTES
anxiety       No Known Allergies  Outpatient Medications Marked as Taking for the 11/20/24 encounter (Office Visit) with Jocelyn Santillan MD   Medication Sig Dispense Refill    sertraline (ZOLOFT) 100 MG tablet Take 1.5 tablets by mouth daily 135 tablet 1         Review of Systems: 14 systems were negative except of what was stated on HPI    Nursing note and vitals reviewed.    Vitals:    11/20/24 0939   BP: 98/60   Site: Left Upper Arm   Position: Sitting   Cuff Size: Medium Adult   Pulse: 71   SpO2: 98%   Weight: 96.6 kg (213 lb)   Height: 1.88 m (6' 2\")     Wt Readings from Last 3 Encounters:   11/20/24 96.6 kg (213 lb)   03/14/24 106.1 kg (234 lb)   04/12/23 103 kg (227 lb)     BP Readings from Last 3 Encounters:   11/20/24 98/60   03/14/24 108/72   04/12/23 124/66     Body mass index is 27.35 kg/m².  Constitutional: Patient appears well-developed and well-nourished. No distress.   Head: Normocephalic and atraumatic.   Neck: Normal range of motion. Neck supple. No thyroidmegaly.   Cardiovascular: Normal rate, regular rhythm, normal heart sounds and intact distal pulses.   Pulmonary/Chest: Effort normal and breath sounds normal. No stridor. No respiratory distress. No wheezes and no rales.   Abdominal: Soft. Bowel sounds are normal. No distension and no mass. No tenderness. No rebound and no guarding.   Musculoskeletal: No edema and no tenderness.   Skin: No rash or erythema.

## 2024-12-18 ENCOUNTER — TELEMEDICINE (OUTPATIENT)
Dept: INTERNAL MEDICINE CLINIC | Age: 29
End: 2024-12-18

## 2024-12-18 DIAGNOSIS — R51.9 HEADACHE DISORDER: ICD-10-CM

## 2024-12-18 NOTE — PROGRESS NOTES
Patient was evaluated through a synchronous (real-time) video encounter. Patient identification was verified at the start of the visit. She (or guardian if applicable) is aware that this is a billable service, which includes applicable co-pays. This visit was conducted with the patient's (and/or legal guardian's) verbal consent. She has not had a related appointment within my department in the past 7 days or scheduled within the next 24 hours.   The patient was located at Home: [address] [place], [place] [id number].  The provider was located at Facility (Appt Dept): [address] [place] [address], [address]  [place] [address].    Date of Service:  12/18/2024    Chief Complaint:      Chief Complaint   Patient presents with    Headache       Assessment/Plan:    Richard was seen today for headache.    Diagnoses and all orders for this visit:    Headache disorder  Change to amitriptyline (ELAVIL) 25 MG tablet; Take 1 tablet by mouth nightly    Stable and continue on current medications.    Return Fasting PE 3/5.      HPI:  Richard Vásquez is a 29 y.o.      Headache on top of his head have improve to 2-3 in the past month on Elavil 10 mg 2 evening and Mobic 15 mg as needed headache.  No more neck down to his shoulders bilaterally.  No family history headache. Pain in his neck and headache does go away after taking tylenol 2 daily for a few hours and pain comes back. No photophobia or nausea. He's getting about 6 hrs a night. No change in caffeine intake of 2 cups of coffee a day.     No results found for: \"LABA1C\"  Lab Results   Component Value Date     03/14/2024    K 4.8 03/14/2024     03/14/2024    CO2 24 03/14/2024    BUN 17 03/14/2024    CREATININE 1.0 03/14/2024    GLUCOSE 95 03/14/2024    CALCIUM 10.3 03/14/2024     Lab Results   Component Value Date/Time    CHOL 239 03/14/2024 09:59 AM    TRIG 69 03/14/2024 09:59 AM    HDL 54 03/14/2024 09:59 AM     Lab Results   Component Value Date    ALT 21 03/14/2024    AST 25

## 2025-03-04 ASSESSMENT — PATIENT HEALTH QUESTIONNAIRE - PHQ9
SUM OF ALL RESPONSES TO PHQ9 QUESTIONS 1 & 2: 1
2. FEELING DOWN, DEPRESSED OR HOPELESS: NOT AT ALL
SUM OF ALL RESPONSES TO PHQ QUESTIONS 1-9: 1
1. LITTLE INTEREST OR PLEASURE IN DOING THINGS: SEVERAL DAYS
SUM OF ALL RESPONSES TO PHQ QUESTIONS 1-9: 1
SUM OF ALL RESPONSES TO PHQ QUESTIONS 1-9: 1
2. FEELING DOWN, DEPRESSED OR HOPELESS: NOT AT ALL
1. LITTLE INTEREST OR PLEASURE IN DOING THINGS: SEVERAL DAYS
SUM OF ALL RESPONSES TO PHQ QUESTIONS 1-9: 1

## 2025-03-11 ENCOUNTER — OFFICE VISIT (OUTPATIENT)
Dept: INTERNAL MEDICINE CLINIC | Age: 30
End: 2025-03-11
Payer: COMMERCIAL

## 2025-03-11 VITALS
DIASTOLIC BLOOD PRESSURE: 70 MMHG | BODY MASS INDEX: 29.13 KG/M2 | OXYGEN SATURATION: 98 % | HEART RATE: 79 BPM | HEIGHT: 74 IN | SYSTOLIC BLOOD PRESSURE: 116 MMHG | WEIGHT: 227 LBS

## 2025-03-11 DIAGNOSIS — R51.9 HEADACHE DISORDER: ICD-10-CM

## 2025-03-11 DIAGNOSIS — Z00.00 ENCOUNTER FOR WELL ADULT EXAM WITHOUT ABNORMAL FINDINGS: Primary | ICD-10-CM

## 2025-03-11 DIAGNOSIS — F43.22 ADJUSTMENT DISORDER WITH ANXIETY: ICD-10-CM

## 2025-03-11 PROCEDURE — 99395 PREV VISIT EST AGE 18-39: CPT | Performed by: INTERNAL MEDICINE

## 2025-03-11 RX ORDER — SERTRALINE HYDROCHLORIDE 100 MG/1
100 TABLET, FILM COATED ORAL DAILY
Qty: 90 TABLET | Refills: 1 | Status: SHIPPED | OUTPATIENT
Start: 2025-03-11

## 2025-03-11 SDOH — ECONOMIC STABILITY: FOOD INSECURITY: WITHIN THE PAST 12 MONTHS, YOU WORRIED THAT YOUR FOOD WOULD RUN OUT BEFORE YOU GOT MONEY TO BUY MORE.: NEVER TRUE

## 2025-03-11 SDOH — ECONOMIC STABILITY: FOOD INSECURITY: WITHIN THE PAST 12 MONTHS, THE FOOD YOU BOUGHT JUST DIDN'T LAST AND YOU DIDN'T HAVE MONEY TO GET MORE.: NEVER TRUE

## 2025-03-11 NOTE — PROGRESS NOTES
[facility] Primary Care  History and Physical  Jocelyn Santillan M.D.        Richard Vásquez  YOB: 1995    Date of Service:  3/11/2025    Chief Complaint:   Richard Vásquez is a 30 y.o. male who presents for   Chief Complaint   Patient presents with    Annual Exam     No new issues or concerns at this time       Assessment/Plan:    Richard was seen today for annual exam.    Diagnoses and all orders for this visit:    Encounter for well adult exam without abnormal findings  -     Lipid Panel  -     Comprehensive Metabolic Panel  -     CBC with Auto Differential    Adjustment disorder with anxiety  -     sertraline (ZOLOFT) 100 MG tablet; Take 1 tablet by mouth daily    Headache disorder  -     amitriptyline (ELAVIL) 25 MG tablet; Take 1 tablet by mouth nightly        Return VV Anxiety and HA 9/11.      HPI: Here for Annual Physical and Follow up.    Headache on top of his head have improve to 2-3 in the past month on Elavil 10 mg 2 evening and Mobic 15 mg as needed headache.  No more neck down to his shoulders bilaterally.  No family history headache. Pain in his neck and headache does go away after taking tylenol 2 daily for a few hours and pain comes back. No photophobia or nausea. He's getting about 6 hrs a night. No change in caffeine intake of 2 cups of coffee a day.     No results found for: \"LABA1C\"  Lab Results   Component Value Date     03/14/2024    K 4.8 03/14/2024     03/14/2024    CO2 24 03/14/2024    BUN 17 03/14/2024    CREATININE 1.0 03/14/2024    GLUCOSE 95 03/14/2024    CALCIUM 10.3 03/14/2024     Lab Results   Component Value Date/Time    CHOL 239 03/14/2024 09:59 AM    TRIG 69 03/14/2024 09:59 AM    HDL 54 03/14/2024 09:59 AM     Lab Results   Component Value Date    ALT 21 03/14/2024    AST 25 03/14/2024     No results found for: \"TSH\", \"T4FREE\", \"T3FREE\"  Lab Results   Component Value Date    WBC 5.2 03/14/2024    HGB 16.0 03/14/2024    HCT 46.3 03/14/2024    MCV 92.6

## 2025-03-12 ENCOUNTER — RESULTS FOLLOW-UP (OUTPATIENT)
Dept: INTERNAL MEDICINE CLINIC | Age: 30
End: 2025-03-12

## 2025-03-12 LAB
ALBUMIN SERPL-MCNC: 5.1 G/DL (ref 3.4–5)
ALBUMIN/GLOB SERPL: 2 {RATIO} (ref 1.1–2.2)
ALP SERPL-CCNC: 65 U/L (ref 40–129)
ALT SERPL-CCNC: 26 U/L (ref 10–40)
ANION GAP SERPL CALCULATED.3IONS-SCNC: 14 MMOL/L (ref 3–16)
AST SERPL-CCNC: 25 U/L (ref 15–37)
BASOPHILS # BLD: 0.1 K/UL (ref 0–0.2)
BASOPHILS NFR BLD: 1.5 %
BILIRUB SERPL-MCNC: 0.6 MG/DL (ref 0–1)
BUN SERPL-MCNC: 13 MG/DL (ref 7–20)
CALCIUM SERPL-MCNC: 10.2 MG/DL (ref 8.3–10.6)
CHLORIDE SERPL-SCNC: 101 MMOL/L (ref 99–110)
CHOLEST SERPL-MCNC: 264 MG/DL (ref 0–199)
CO2 SERPL-SCNC: 24 MMOL/L (ref 21–32)
CREAT SERPL-MCNC: 0.9 MG/DL (ref 0.9–1.3)
DEPRECATED RDW RBC AUTO: 14 % (ref 12.4–15.4)
EOSINOPHIL # BLD: 0.3 K/UL (ref 0–0.6)
EOSINOPHIL NFR BLD: 4.1 %
GFR SERPLBLD CREATININE-BSD FMLA CKD-EPI: >90 ML/MIN/{1.73_M2}
GLUCOSE SERPL-MCNC: 78 MG/DL (ref 70–99)
HCT VFR BLD AUTO: 47 % (ref 40.5–52.5)
HDLC SERPL-MCNC: 75 MG/DL (ref 40–60)
HGB BLD-MCNC: 15.8 G/DL (ref 13.5–17.5)
LDLC SERPL CALC-MCNC: 173 MG/DL
LYMPHOCYTES # BLD: 1.9 K/UL (ref 1–5.1)
LYMPHOCYTES NFR BLD: 28.4 %
MCH RBC QN AUTO: 31.3 PG (ref 26–34)
MCHC RBC AUTO-ENTMCNC: 33.6 G/DL (ref 31–36)
MCV RBC AUTO: 93.1 FL (ref 80–100)
MONOCYTES # BLD: 0.4 K/UL (ref 0–1.3)
MONOCYTES NFR BLD: 6.4 %
NEUTROPHILS # BLD: 3.9 K/UL (ref 1.7–7.7)
NEUTROPHILS NFR BLD: 59.6 %
PLATELET # BLD AUTO: 275 K/UL (ref 135–450)
PMV BLD AUTO: 8.4 FL (ref 5–10.5)
POTASSIUM SERPL-SCNC: 4.6 MMOL/L (ref 3.5–5.1)
PROT SERPL-MCNC: 7.6 G/DL (ref 6.4–8.2)
RBC # BLD AUTO: 5.05 M/UL (ref 4.2–5.9)
SODIUM SERPL-SCNC: 139 MMOL/L (ref 136–145)
TRIGL SERPL-MCNC: 79 MG/DL (ref 0–150)
VLDLC SERPL CALC-MCNC: 16 MG/DL
WBC # BLD AUTO: 6.6 K/UL (ref 4–11)

## 2025-06-23 ENCOUNTER — APPOINTMENT (OUTPATIENT)
Dept: GENERAL RADIOLOGY | Age: 30
End: 2025-06-23
Payer: COMMERCIAL

## 2025-06-23 ENCOUNTER — HOSPITAL ENCOUNTER (EMERGENCY)
Age: 30
Discharge: HOME | End: 2025-06-23
Payer: COMMERCIAL

## 2025-06-23 VITALS
HEART RATE: 67 BPM | TEMPERATURE: 98.2 F | WEIGHT: 221.8 LBS | RESPIRATION RATE: 16 BRPM | BODY MASS INDEX: 28.47 KG/M2 | OXYGEN SATURATION: 100 % | HEIGHT: 74 IN | SYSTOLIC BLOOD PRESSURE: 121 MMHG | DIASTOLIC BLOOD PRESSURE: 64 MMHG

## 2025-06-23 DIAGNOSIS — R07.9 CHEST PAIN, UNSPECIFIED TYPE: Primary | ICD-10-CM

## 2025-06-23 LAB
ALBUMIN SERPL-MCNC: 4.6 G/DL (ref 3.4–5)
ALBUMIN/GLOB SERPL: 2 {RATIO} (ref 1.1–2.2)
ALP SERPL-CCNC: 53 U/L (ref 40–129)
ALT SERPL-CCNC: 21 U/L (ref 10–40)
ANION GAP SERPL CALCULATED.3IONS-SCNC: 13 MMOL/L (ref 3–16)
AST SERPL-CCNC: 31 U/L (ref 15–37)
BASOPHILS # BLD: 0.1 K/UL (ref 0–0.2)
BASOPHILS NFR BLD: 0.9 %
BILIRUB SERPL-MCNC: 0.4 MG/DL (ref 0–1)
BUN SERPL-MCNC: 5 MG/DL (ref 7–20)
CALCIUM SERPL-MCNC: 9.8 MG/DL (ref 8.3–10.6)
CHLORIDE SERPL-SCNC: 96 MMOL/L (ref 99–110)
CO2 SERPL-SCNC: 26 MMOL/L (ref 21–32)
CREAT SERPL-MCNC: 0.9 MG/DL (ref 0.9–1.3)
D-DIMER QUANTITATIVE: 0.56 UG/ML FEU (ref 0–0.6)
DEPRECATED RDW RBC AUTO: 13.5 % (ref 12.4–15.4)
EOSINOPHIL # BLD: 0.3 K/UL (ref 0–0.6)
EOSINOPHIL NFR BLD: 3.9 %
GFR SERPLBLD CREATININE-BSD FMLA CKD-EPI: >90 ML/MIN/{1.73_M2}
GLUCOSE SERPL-MCNC: 93 MG/DL (ref 70–99)
HCT VFR BLD AUTO: 42.4 % (ref 40.5–52.5)
HGB BLD-MCNC: 14.5 G/DL (ref 13.5–17.5)
LYMPHOCYTES # BLD: 1.8 K/UL (ref 1–5.1)
LYMPHOCYTES NFR BLD: 20.7 %
MCH RBC QN AUTO: 32.4 PG (ref 26–34)
MCHC RBC AUTO-ENTMCNC: 34.3 G/DL (ref 31–36)
MCV RBC AUTO: 94.4 FL (ref 80–100)
MONOCYTES # BLD: 0.6 K/UL (ref 0–1.3)
MONOCYTES NFR BLD: 6.7 %
NEUTROPHILS # BLD: 5.9 K/UL (ref 1.7–7.7)
NEUTROPHILS NFR BLD: 67.8 %
PLATELET # BLD AUTO: 258 K/UL (ref 135–450)
PMV BLD AUTO: 7.5 FL (ref 5–10.5)
POTASSIUM SERPL-SCNC: 3.6 MMOL/L (ref 3.5–5.1)
PROT SERPL-MCNC: 6.9 G/DL (ref 6.4–8.2)
RBC # BLD AUTO: 4.48 M/UL (ref 4.2–5.9)
SODIUM SERPL-SCNC: 135 MMOL/L (ref 136–145)
TROPONIN, HIGH SENSITIVITY: <6 NG/L (ref 0–22)
TROPONIN, HIGH SENSITIVITY: <6 NG/L (ref 0–22)
WBC # BLD AUTO: 8.8 K/UL (ref 4–11)

## 2025-06-23 PROCEDURE — 84484 ASSAY OF TROPONIN QUANT: CPT

## 2025-06-23 PROCEDURE — 99285 EMERGENCY DEPT VISIT HI MDM: CPT

## 2025-06-23 PROCEDURE — 36415 COLL VENOUS BLD VENIPUNCTURE: CPT

## 2025-06-23 PROCEDURE — 85379 FIBRIN DEGRADATION QUANT: CPT

## 2025-06-23 PROCEDURE — 85025 COMPLETE CBC W/AUTO DIFF WBC: CPT

## 2025-06-23 PROCEDURE — 80053 COMPREHEN METABOLIC PANEL: CPT

## 2025-06-23 PROCEDURE — 71046 X-RAY EXAM CHEST 2 VIEWS: CPT

## 2025-06-23 PROCEDURE — 93005 ELECTROCARDIOGRAM TRACING: CPT | Performed by: EMERGENCY MEDICINE

## 2025-06-23 ASSESSMENT — PAIN DESCRIPTION - PAIN TYPE: TYPE: ACUTE PAIN

## 2025-06-23 ASSESSMENT — PAIN DESCRIPTION - DESCRIPTORS: DESCRIPTORS: ACHING

## 2025-06-23 ASSESSMENT — PAIN - FUNCTIONAL ASSESSMENT: PAIN_FUNCTIONAL_ASSESSMENT: 0-10

## 2025-06-23 ASSESSMENT — HEART SCORE: ECG: NORMAL

## 2025-06-23 ASSESSMENT — PAIN DESCRIPTION - LOCATION: LOCATION: CHEST

## 2025-06-23 ASSESSMENT — PAIN SCALES - GENERAL: PAINLEVEL_OUTOF10: 6

## 2025-06-24 LAB
EKG ATRIAL RATE: 72 BPM
EKG DIAGNOSIS: NORMAL
EKG P AXIS: 47 DEGREES
EKG P-R INTERVAL: 148 MS
EKG Q-T INTERVAL: 352 MS
EKG QRS DURATION: 86 MS
EKG QTC CALCULATION (BAZETT): 385 MS
EKG R AXIS: 64 DEGREES
EKG T AXIS: 49 DEGREES
EKG VENTRICULAR RATE: 72 BPM

## 2025-06-24 PROCEDURE — 93010 ELECTROCARDIOGRAM REPORT: CPT | Performed by: INTERNAL MEDICINE

## 2025-06-24 NOTE — ED PROVIDER NOTES
I have reviewed the below EKG. I was not otherwise involved in this patient's care.      EKG  The Ekg interpreted by myself  normal sinus rhythm with a rate of 72  Axis is   Normal  QTc is  normal  Intervals and Durations are unremarkable.      No specific ST-T wave changes appreciated.  No evidence of acute ischemia.         Trudy Salvador MD  06/23/25 8212

## 2025-06-24 NOTE — ED PROVIDER NOTES
[facility] EMERGENCY DEPARTMENT  EMERGENCY DEPARTMENT ENCOUNTER        Pt Name: Richard Vásquez  MRN: [id number]  Birthdate 1995  Date of evaluation: 6/23/2025  Provider: LUCRECIA Mortensen  PCP: Jocelyn Santillan MD  Note Started: 11:34 PM EDT 6/23/25      CONSTANTINE. I have evaluated this patient.        CHIEF COMPLAINT       Chief Complaint   Patient presents with    Chest Pain     Pt states around 1300 started having chest pain, sob and dizziness. Pt states he does had 3 redbulls today. Pt denies any cardiac history       HISTORY OF PRESENT ILLNESS: 1 or more Elements     History From: patient      Richard Vásquez is a 30 y.o. male who presents to the emergency department complaining of chest pain.  The patient states beginning around 1 PM today he began reporting chest pain and felt short of breath.  He states he felt little bit lightheaded.  He described dizziness to the triage nurse but denies a room spinning sensation.  He describes lightheaded as feeling like he might pass out.  He states he typically drinks coffee but today drinks 3 red bowls.  He denies any cardiac history.  He states he is actually feeling better at this time but still little bit symptomatic.  Chest pain he described as pressure.  He does state he has a history of anxiety as well.  Denies any fevers or chills.  No abdominal pain, nausea or vomiting.  No history of blood clots.  Denies any recent long distance travel.  He does not have any leg swelling.  He is not on any hormones.    Nursing Notes were all reviewed and agreed with or any disagreements were addressed in the HPI.    REVIEW OF SYSTEMS :      Review of Systems    Positives and Pertinent negatives as per HPI.     SURGICAL HISTORY     Past Surgical History:   Procedure Laterality Date    APPENDECTOMY         CURRENTMEDICATIONS       Discharge Medication List as of 6/23/2025 11:47 PM        CONTINUE these medications which have NOT CHANGED    Details   sertraline

## 2025-06-30 SDOH — HEALTH STABILITY: PHYSICAL HEALTH: ON AVERAGE, HOW MANY DAYS PER WEEK DO YOU ENGAGE IN MODERATE TO STRENUOUS EXERCISE (LIKE A BRISK WALK)?: 5 DAYS

## 2025-06-30 SDOH — HEALTH STABILITY: PHYSICAL HEALTH: ON AVERAGE, HOW MANY MINUTES DO YOU ENGAGE IN EXERCISE AT THIS LEVEL?: 60 MIN

## 2025-07-03 ENCOUNTER — OFFICE VISIT (OUTPATIENT)
Dept: PRIMARY CARE CLINIC | Age: 30
End: 2025-07-03

## 2025-07-03 VITALS
OXYGEN SATURATION: 97 % | DIASTOLIC BLOOD PRESSURE: 70 MMHG | SYSTOLIC BLOOD PRESSURE: 122 MMHG | BODY MASS INDEX: 27.59 KG/M2 | WEIGHT: 215 LBS | HEIGHT: 74 IN | RESPIRATION RATE: 16 BRPM | HEART RATE: 70 BPM

## 2025-07-03 DIAGNOSIS — R07.9 CHEST PAIN, UNSPECIFIED TYPE: Primary | ICD-10-CM

## 2025-07-03 DIAGNOSIS — K21.9 GASTROESOPHAGEAL REFLUX DISEASE WITHOUT ESOPHAGITIS: ICD-10-CM

## 2025-07-03 DIAGNOSIS — Z76.89 ENCOUNTER TO ESTABLISH CARE: ICD-10-CM

## 2025-07-03 RX ORDER — FAMOTIDINE 20 MG/1
20 TABLET, FILM COATED ORAL 2 TIMES DAILY
Qty: 60 TABLET | Refills: 0 | Status: SHIPPED | OUTPATIENT
Start: 2025-07-03

## 2025-07-03 NOTE — PROGRESS NOTES
onset of chest pain approximately 10 days ago. Pt was working in a warehouse at time of onset, with heavy lifting. Pt developed epigastric chest pain with symptoms of SOB, lightheadedness and diaphoresis and became concerned for an MI and went to the ED. His workup in the ED was negative for ACS or PE. Pt reports that he had 3 [employer] energy drinks the day of his symptoms, and reports a higher-than-usual alcohol intake over the previous weekend due to a wedding. Pt additionally notes that his warehouse was very hot that day, despite being somewhat temperature controlled.     Now, symptoms are intermittent, most notably with PO intake of black coffee or spicy foods.    Chest Pain   This is a new problem. The current episode started 1 to 4 weeks ago. The onset quality is sudden. The problem occurs intermittently. The problem has been gradually improving. The pain is present in the epigastric region. The pain is moderate. The pain does not radiate. Pertinent negatives include no abdominal pain, diaphoresis, nausea, shortness of breath or vomiting. The pain is aggravated by food. He has tried rest for the symptoms. Risk factors include alcohol intake, male gender and smoking/tobacco exposure.   His past medical history is significant for anxiety/panic attacks.   Pertinent negatives for past medical history include no diabetes, no hypertension and no MI.   Pertinent negatives for family medical history include: no CAD, no heart disease and no early MI. Prior diagnostic workup includes chest x-ray.     Review of Systems   Constitutional:  Negative for diaphoresis.   Respiratory:  Negative for shortness of breath.    Cardiovascular:  Positive for chest pain.   Gastrointestinal:  Negative for abdominal pain, nausea and vomiting.             Objective     /70 (BP Site: Right Upper Arm, Patient Position: Sitting, BP Cuff Size: Large Adult)   Pulse 70   Resp 16   Ht 1.88 m (6' 2\")   Wt 97.5 kg (215 lb)   SpO2 97%

## 2025-07-04 ASSESSMENT — ENCOUNTER SYMPTOMS
SHORTNESS OF BREATH: 0
VOMITING: 0
ABDOMINAL PAIN: 0
NAUSEA: 0

## 2025-08-03 DIAGNOSIS — K21.9 GASTROESOPHAGEAL REFLUX DISEASE WITHOUT ESOPHAGITIS: ICD-10-CM

## 2025-08-03 DIAGNOSIS — R07.9 CHEST PAIN, UNSPECIFIED TYPE: ICD-10-CM

## 2025-08-04 RX ORDER — FAMOTIDINE 20 MG/1
20 TABLET, FILM COATED ORAL 2 TIMES DAILY
Qty: 60 TABLET | Refills: 0 | Status: SHIPPED | OUTPATIENT
Start: 2025-08-04

## 2025-08-21 ENCOUNTER — OFFICE VISIT (OUTPATIENT)
Dept: PRIMARY CARE CLINIC | Age: 30
End: 2025-08-21
Payer: COMMERCIAL

## 2025-08-21 VITALS
HEART RATE: 63 BPM | SYSTOLIC BLOOD PRESSURE: 120 MMHG | BODY MASS INDEX: 26.04 KG/M2 | OXYGEN SATURATION: 96 % | WEIGHT: 202.8 LBS | DIASTOLIC BLOOD PRESSURE: 66 MMHG

## 2025-08-21 DIAGNOSIS — F43.22 ADJUSTMENT DISORDER WITH ANXIETY: ICD-10-CM

## 2025-08-21 DIAGNOSIS — K21.9 GASTROESOPHAGEAL REFLUX DISEASE WITHOUT ESOPHAGITIS: Primary | ICD-10-CM

## 2025-08-21 PROCEDURE — 99214 OFFICE O/P EST MOD 30 MIN: CPT

## 2025-08-21 RX ORDER — SERTRALINE HYDROCHLORIDE 100 MG/1
100 TABLET, FILM COATED ORAL DAILY
Qty: 90 TABLET | Refills: 2 | Status: SHIPPED | OUTPATIENT
Start: 2025-08-21

## 2025-08-22 ASSESSMENT — ENCOUNTER SYMPTOMS
NAUSEA: 0
ABDOMINAL PAIN: 0
VOMITING: 0
SHORTNESS OF BREATH: 0